# Patient Record
Sex: FEMALE | Race: WHITE | NOT HISPANIC OR LATINO | Employment: FULL TIME | URBAN - METROPOLITAN AREA
[De-identification: names, ages, dates, MRNs, and addresses within clinical notes are randomized per-mention and may not be internally consistent; named-entity substitution may affect disease eponyms.]

---

## 2023-02-01 ENCOUNTER — OFFICE VISIT (OUTPATIENT)
Dept: OBGYN CLINIC | Facility: CLINIC | Age: 28
End: 2023-02-01

## 2023-02-01 VITALS
WEIGHT: 118 LBS | BODY MASS INDEX: 20.14 KG/M2 | HEIGHT: 64 IN | DIASTOLIC BLOOD PRESSURE: 80 MMHG | SYSTOLIC BLOOD PRESSURE: 116 MMHG

## 2023-02-01 DIAGNOSIS — N94.10 DYSPAREUNIA IN FEMALE: ICD-10-CM

## 2023-02-01 DIAGNOSIS — Z12.4 ENCOUNTER FOR SCREENING FOR MALIGNANT NEOPLASM OF CERVIX: ICD-10-CM

## 2023-02-01 DIAGNOSIS — Z01.419 WELL WOMAN EXAM WITH ROUTINE GYNECOLOGICAL EXAM: Primary | ICD-10-CM

## 2023-02-01 DIAGNOSIS — L30.9 VULVAR DERMATITIS: ICD-10-CM

## 2023-02-01 DIAGNOSIS — Z11.51 SCREENING FOR HPV (HUMAN PAPILLOMAVIRUS): ICD-10-CM

## 2023-02-01 DIAGNOSIS — A63.0 CONDYLOMA ACUMINATUM OF VULVA: ICD-10-CM

## 2023-02-01 PROBLEM — N87.0: Status: ACTIVE | Noted: 2020-09-10

## 2023-02-01 PROBLEM — N87.1: Status: ACTIVE | Noted: 2020-09-10

## 2023-02-01 RX ORDER — CLOBETASOL PROPIONATE 0.5 MG/G
OINTMENT TOPICAL 2 TIMES DAILY
Qty: 30 G | Refills: 0 | Status: SHIPPED | OUTPATIENT
Start: 2023-02-01

## 2023-02-01 NOTE — PROGRESS NOTES
ASSESSMENT & PLAN:   Diagnoses and all orders for this visit:    Well woman exam with routine gynecological exam  -     Liquid-based pap, screening    Encounter for screening for malignant neoplasm of cervix  -     Liquid-based pap, screening    Screening for HPV (human papillomavirus)  -     Liquid-based pap, screening    Dyspareunia in female  Comments:  rec silicone based lubricants, coconout oil, dove sensitive bar soap    Vulvar dermatitis  Comments:  Recommend clobetasol, daily zyrtec, dove sensitive bar soap  Orders:  -     clobetasol (TEMOVATE) 0 05 % ointment; Apply topically 2 (two) times a day    Condyloma acuminatum of vulva  Comments:  left labia minora          The following were reviewed in today's visit: ASCCP guidelines, Gardisil vaccination, STD testing breast self exam, STD testing, exercise and healthy diet  Patient to return to office in yearly for annual exam      All questions have been answered to her satisfaction  CC:  Annual Gynecologic Examination  Chief Complaint   Patient presents with   • Gynecologic Exam     Np yearly pap is UTD  Pt has been getting night sweats around her period and would like to discuss  Pt has been having itching and discomfort randomly for the past 3 months along with discomfort with sex   HPI: Jocelyn Correa is a 28 y o  No obstetric history on file  who presents for annual gynecologic examination  She has the following concerns:  Vaginal itching and discomfort with intercourse  Patient states this is intermittent  The itching is located at the vaginal opening  She denies hx of vaginal infections  She denies new soaps, detergents, condoms or partners  She states the discomfort with intercourse does not happen all the time, but does occur more often than not  She denies pelvic pain  They use lubricated condoms and patient does not use other lubrication         Health Maintenance:    Exercise: frequently  Breast exams/breast awareness: yes  Diet: well balanced diet      Past Medical History:   Diagnosis Date   • Abnormal Pap smear of cervix        History reviewed  No pertinent surgical history  Past OB/Gyn History:  Period Cycle (Days): 28  Period Duration (Days): 4-5  Period Pattern: Regular  Menstrual Flow: Light  Menstrual Control: Tampon  Dysmenorrhea: (!) Mild  Dysmenorrhea Symptoms: Cramping, Other (Comment) (night sweats)Patient's last menstrual period was 01/28/2023 (exact date)  Last Pap: 2021 : no abnormalities  History of abnormal Pap smear: yes  HPV vaccine completed: yes    Patient is currently sexually active  STD testing: no  Current contraception: condoms      Family History  History reviewed  No pertinent family history  Family history of uterine or ovarian cancer: no  Family history of breast cancer: no  Family history of colon cancer: no    Social History:  Social History     Socioeconomic History   • Marital status: Unknown     Spouse name: Not on file   • Number of children: Not on file   • Years of education: Not on file   • Highest education level: Not on file   Occupational History   • Not on file   Tobacco Use   • Smoking status: Never   • Smokeless tobacco: Never   Vaping Use   • Vaping Use: Never used   Substance and Sexual Activity   • Alcohol use: Yes     Comment: occ  • Drug use: Never   • Sexual activity: Yes     Partners: Male     Birth control/protection: Condom Male   Other Topics Concern   • Not on file   Social History Narrative   • Not on file     Social Determinants of Health     Financial Resource Strain: Not on file   Food Insecurity: Not on file   Transportation Needs: Not on file   Physical Activity: Not on file   Stress: Not on file   Social Connections: Not on file   Intimate Partner Violence: Not on file   Housing Stability: Not on file     Domestic violence screen: negative    Allergies:   Allergies   Allergen Reactions   • Amoxicillin Rash       Medications:    Current Outpatient Medications:   • clobetasol (TEMOVATE) 0 05 % ointment, Apply topically 2 (two) times a day, Disp: 30 g, Rfl: 0    Review of Systems:  Review of Systems   Constitutional: Negative for chills, fever and unexpected weight change  Respiratory: Negative for shortness of breath  Cardiovascular: Negative for chest pain  Gastrointestinal: Negative for abdominal pain, diarrhea, nausea and vomiting  Skin: Negative for rash  Psychiatric/Behavioral: Negative for dysphoric mood  The patient is not nervous/anxious  Physical Exam:  /80 (BP Location: Right arm, Patient Position: Sitting, Cuff Size: Standard)   Ht 5' 4" (1 626 m)   Wt 53 5 kg (118 lb)   LMP 01/28/2023 (Exact Date)   BMI 20 25 kg/m²    Physical Exam  Constitutional:       Appearance: Normal appearance  Genitourinary:      Vulva and urethral meatus normal       No lesions in the vagina  Right Labia: No rash or lesions  Left Labia: lesions  Left Labia: No rash  No vaginal discharge, erythema or bleeding  Right Adnexa: not tender and no mass present  Left Adnexa: not tender and no mass present  No cervical discharge or lesion  Uterus is not tender  Breasts:     Breasts are symmetrical       Right: No mass or skin change  Left: No mass or skin change  HENT:      Head: Normocephalic and atraumatic  Cardiovascular:      Rate and Rhythm: Normal rate and regular rhythm  Heart sounds: Normal heart sounds  No murmur heard  No friction rub  No gallop  Pulmonary:      Effort: Pulmonary effort is normal       Breath sounds: Normal breath sounds  No wheezing, rhonchi or rales  Abdominal:      General: Abdomen is flat  There is no distension  Palpations: Abdomen is soft  Tenderness: There is no abdominal tenderness  Musculoskeletal:      Cervical back: Neck supple  Lymphadenopathy:      Upper Body:      Right upper body: No axillary adenopathy        Left upper body: No axillary adenopathy  Neurological:      General: No focal deficit present  Mental Status: She is alert  Skin:     General: Skin is warm and dry  Psychiatric:         Mood and Affect: Mood normal          Behavior: Behavior normal    Vitals reviewed

## 2023-02-08 ENCOUNTER — OFFICE VISIT (OUTPATIENT)
Dept: FAMILY MEDICINE CLINIC | Facility: CLINIC | Age: 28
End: 2023-02-08

## 2023-02-08 VITALS
HEIGHT: 64 IN | HEART RATE: 76 BPM | BODY MASS INDEX: 19.97 KG/M2 | TEMPERATURE: 97.6 F | WEIGHT: 117 LBS | RESPIRATION RATE: 14 BRPM | SYSTOLIC BLOOD PRESSURE: 100 MMHG | OXYGEN SATURATION: 100 % | DIASTOLIC BLOOD PRESSURE: 58 MMHG

## 2023-02-08 DIAGNOSIS — K59.00 CONSTIPATION, UNSPECIFIED CONSTIPATION TYPE: ICD-10-CM

## 2023-02-08 DIAGNOSIS — R71.8 LOW MEAN CORPUSCULAR VOLUME (MCV): ICD-10-CM

## 2023-02-08 DIAGNOSIS — D64.9 ANEMIA, UNSPECIFIED TYPE: ICD-10-CM

## 2023-02-08 DIAGNOSIS — Z11.59 NEED FOR HEPATITIS C SCREENING TEST: ICD-10-CM

## 2023-02-08 DIAGNOSIS — Z00.00 ANNUAL PHYSICAL EXAM: Primary | ICD-10-CM

## 2023-02-08 DIAGNOSIS — K62.5 RECTAL BLEEDING: ICD-10-CM

## 2023-02-08 DIAGNOSIS — M79.10 MYALGIA: ICD-10-CM

## 2023-02-08 DIAGNOSIS — Z13.1 SCREENING FOR DIABETES MELLITUS: ICD-10-CM

## 2023-02-08 DIAGNOSIS — Z11.4 ENCOUNTER FOR SCREENING FOR HIV: ICD-10-CM

## 2023-02-08 DIAGNOSIS — R61 NIGHT SWEATS: ICD-10-CM

## 2023-02-08 DIAGNOSIS — Z13.6 SCREENING FOR CARDIOVASCULAR CONDITION: ICD-10-CM

## 2023-02-08 LAB
LAB AP GYN PRIMARY INTERPRETATION: NORMAL
Lab: NORMAL
PATH INTERP SPEC-IMP: NORMAL

## 2023-02-08 NOTE — PATIENT INSTRUCTIONS
To Do:     Thinks she had Tdap at [facility], [place], operated by [facility] 149 8/23  - medical records to be requested by patient  Send copy of COVID card in portal     3  Trial OTC Colace 100mg twice daily as needed for constipation  Please contact your insurance if you are uncertain of coverage for plan of care items  Your insurance may not cover the cost of your Vitamin D blood test, which is approximately $65-70  Please notify the lab prior to blood draw if you would like to decline this test             Wellness Visit for Adults   AMBULATORY CARE:   A wellness visit  is when you see your healthcare provider to get screened for health problems  Your healthcare provider will also give you advice on how to stay healthy  Write down your questions so you remember to ask them  Ask your healthcare provider how often you should have a wellness visit  What happens at a wellness visit:  Your healthcare provider will ask about your health, and your family history of health problems  This includes high blood pressure, heart disease, and cancer  He or she will ask if you have symptoms that concern you, if you smoke, and about your mood  You may also be asked about your intake of medicines, supplements, food, and alcohol  Any of the following may be done: Your weight  will be checked  Your height may also be checked so your body mass index (BMI) can be calculated  Your BMI shows if you are at a healthy weight  Your blood pressure  and heart rate will be checked  Your temperature may also be checked  Blood and urine tests  may be done  Blood tests may be done to check your cholesterol levels  Abnormal cholesterol levels increase your risk for heart disease and stroke  You may also need a blood or urine test to check for diabetes if you are at increased risk  Urine tests may be done to look for signs of an infection or kidney disease  A physical exam  includes checking your heartbeat and lungs with a stethoscope   Your healthcare provider may also check your skin to look for sun damage  Screening tests  may be recommended  A screening test is done to check for diseases that may not cause symptoms  The screening tests you may need depend on your age, gender, family history, and lifestyle habits  For example, colorectal screening may be recommended if you are 48years old or older  Screening tests you need if you are a woman:   A Pap smear  is used to screen for cervical cancer  Pap smears are usually done every 3 to 5 years depending on your age  You may need them more often if you have had abnormal Pap smear test results in the past  Ask your healthcare provider how often you should have a Pap smear  A mammogram  is an x-ray of your breasts to screen for breast cancer  Experts recommend mammograms every 2 years starting at age 48 years  You may need a mammogram at age 52 years or younger if you have an increased risk for breast cancer  Talk to your healthcare provider about when you should start having mammograms and how often you need them  Vaccines you may need:   Get an influenza vaccine  every year  The influenza vaccine protects you from the flu  Several types of viruses cause the flu  The viruses change over time, so new vaccines are made each year  Get a tetanus-diphtheria (Td) booster vaccine  every 10 years  This vaccine protects you against tetanus and diphtheria  Tetanus is a severe infection that may cause painful muscle spasms and lockjaw  Diphtheria is a severe bacterial infection that causes a thick covering in the back of your mouth and throat  Get a human papillomavirus (HPV) vaccine  if you are female and aged 23 to 32 or male 23 to 24 and never received it  This vaccine protects you from HPV infection  HPV is the most common infection spread by sexual contact  HPV may also cause vaginal, penile, and anal cancers  Get a pneumococcal vaccine  if you are aged 72 years or older   The pneumococcal vaccine is an injection given to protect you from pneumococcal disease  Pneumococcal disease is an infection caused by pneumococcal bacteria  The infection may cause pneumonia, meningitis, or an ear infection  Get a shingles vaccine  if you are 60 or older, even if you have had shingles before  The shingles vaccine is an injection to protect you from the varicella-zoster virus  This is the same virus that causes chickenpox  Shingles is a painful rash that develops in people who had chickenpox or have been exposed to the virus  How to eat healthy:  My Plate is a model for planning healthy meals  It shows the types and amounts of foods that should go on your plate  Fruits and vegetables make up about half of your plate, and grains and protein make up the other half  A serving of dairy is included on the side of your plate  The amount of calories and serving sizes you need depends on your age, gender, weight, and height  Examples of healthy foods are listed below:  Eat a variety of vegetables  such as dark green, red, and orange vegetables  You can also include canned vegetables low in sodium (salt) and frozen vegetables without added butter or sauces  Eat a variety of fresh fruits , canned fruit in 100% juice, frozen fruit, and dried fruit  Include whole grains  At least half of the grains you eat should be whole grains  Examples include whole-wheat bread, wheat pasta, brown rice, and whole-grain cereals such as oatmeal     Eat a variety of protein foods such as seafood (fish and shellfish), lean meat, and poultry without skin (turkey and chicken)  Examples of lean meats include pork leg, shoulder, or tenderloin, and beef round, sirloin, tenderloin, and extra lean ground beef  Other protein foods include eggs and egg substitutes, beans, peas, soy products, nuts, and seeds  Choose low-fat dairy products such as skim or 1% milk or low-fat yogurt, cheese, and cottage cheese      Limit unhealthy fats  such as butter, hard margarine, and shortening  Exercise:  Exercise at least 30 minutes per day on most days of the week  Some examples of exercise include walking, biking, dancing, and swimming  You can also fit in more physical activity by taking the stairs instead of the elevator or parking farther away from stores  Include muscle strengthening activities 2 days each week  Regular exercise provides many health benefits  It helps you manage your weight, and decreases your risk for type 2 diabetes, heart disease, stroke, and high blood pressure  Exercise can also help improve your mood  Ask your healthcare provider about the best exercise plan for you  General health and safety guidelines:   Do not smoke  Nicotine and other chemicals in cigarettes and cigars can cause lung damage  Ask your healthcare provider for information if you currently smoke and need help to quit  E-cigarettes or smokeless tobacco still contain nicotine  Talk to your healthcare provider before you use these products  Limit alcohol  A drink of alcohol is 12 ounces of beer, 5 ounces of wine, or 1½ ounces of liquor  Lose weight, if needed  Being overweight increases your risk of certain health conditions  These include heart disease, high blood pressure, type 2 diabetes, and certain types of cancer  Protect your skin  Do not sunbathe or use tanning beds  Use sunscreen with a SPF 15 or higher  Apply sunscreen at least 15 minutes before you go outside  Reapply sunscreen every 2 hours  Wear protective clothing, hats, and sunglasses when you are outside  Drive safely  Always wear your seatbelt  Make sure everyone in your car wears a seatbelt  A seatbelt can save your life if you are in an accident  Do not use your cell phone when you are driving  This could distract you and cause an accident  Pull over if you need to make a call or send a text message  Practice safe sex    Use latex condoms if are sexually active and have more than one partner  Your healthcare provider may recommend screening tests for sexually transmitted infections (STIs)  Wear helmets, lifejackets, and protective gear  Always wear a helmet when you ride a bike or motorcycle, go skiing, or play sports that could cause a head injury  Wear protective equipment when you play sports  Wear a lifejacket when you are on a boat or doing water sports  © Copyright [employer] 2022 Information is for End User's use only and may not be sold, redistributed or otherwise used for commercial purposes  All illustrations and images included in CareNotes® are the copyrighted property of A D A M , Inc  or [facility] Neris Orr   The above information is an  only  It is not intended as medical advice for individual conditions or treatments  Talk to your doctor, nurse or pharmacist before following any medical regimen to see if it is safe and effective for you  Cholesterol and Your Health   AMBULATORY CARE:   Cholesterol  is a waxy, fat-like substance  Your body uses cholesterol to make hormones and new cells, and to protect nerves  Cholesterol is made by your body  It also comes from certain foods you eat, such as meat and dairy products  Your healthcare provider can help you set goals for your cholesterol levels  He or she can help you create a plan to meet your goals  Cholesterol level goals: Your cholesterol level goals depend on your risk for heart disease, your age, and your other health conditions  The following are general guidelines: Total cholesterol  includes low-density lipoprotein (LDL), high-density lipoprotein (HDL), and triglyceride levels  The total cholesterol level should be lower than 200 mg/dL and is best at about 150 mg/dL  LDL cholesterol  is called bad cholesterol  because it forms plaque in your arteries  As plaque builds up, your arteries become narrow, and less blood flows through   When plaque decreases blood flow to your heart, you may have chest pain  If plaque completely blocks an artery that brings blood to your heart, you may have a heart attack  Plaque can break off and form blood clots  Blood clots may block arteries in your brain and cause a stroke  The level should be less than 130 mg/dL and is best at about 100 mg/dL  HDL cholesterol  is called good cholesterol  because it helps remove LDL cholesterol from your arteries  It does this by attaching to LDL cholesterol and carrying it to your liver  Your liver breaks down LDL cholesterol so your body can get rid of it  High levels of HDL cholesterol can help prevent a heart attack and stroke  Low levels of HDL cholesterol can increase your risk for heart disease, heart attack, and stroke  The level should be 60 mg/dL or higher  Triglycerides  are a type of fat that store energy from foods you eat  High levels of triglycerides also cause plaque buildup  This can increase your risk for a heart attack or stroke  If your triglyceride level is high, your LDL cholesterol level may also be high  The level should be less than 150 mg/dL  Any of the following can increase your risk for high cholesterol:   Smoking cigarettes    Being overweight or obese, or not getting enough exercise    Drinking large amounts of alcohol    A medical condition such as hypertension (high blood pressure) or diabetes    Certain genes passed from your parents to you    Age older than 65 years    What you need to know about having your cholesterol levels checked: Adults 21to 39years of age should have their cholesterol levels checked every 4 to 6 years  Adults 45 years or older should have their cholesterol checked every 1 to 2 years  You may need your cholesterol checked more often, or at a younger age, if you have risk factors for heart disease  You may also need to have your cholesterol checked more often if you have other health conditions, such as diabetes   Blood tests are used to check cholesterol levels  Blood tests measure your levels of triglycerides, LDL cholesterol, and HDL cholesterol  How healthy fats affect your cholesterol levels:  Healthy fats, also called unsaturated fats, help lower LDL cholesterol and triglyceride levels  Healthy fats include the following:  Monounsaturated fats  are found in foods such as olive oil, canola oil, avocado, nuts, and olives  Polyunsaturated fats,  such as omega 3 fats, are found in fish, such as salmon, trout, and tuna  They can also be found in plant foods such as flaxseed, walnuts, and soybeans  How unhealthy fats affect your cholesterol levels:  Unhealthy fats increase LDL cholesterol and triglyceride levels  They are found in foods high in cholesterol, saturated fat, and trans fat:  Cholesterol  is found in eggs, dairy, and meat  Saturated fat  is found in butter, cheese, ice cream, whole milk, and coconut oil  Saturated fat is also found in meat, such as sausage, hot dogs, and bologna  Trans fat  is found in liquid oils and is used in fried and baked foods  Foods that contain trans fats include chips, crackers, muffins, sweet rolls, microwave popcorn, and cookies  Treatment  for high cholesterol will also decrease your risk of heart disease, heart attack, and stroke  Treatment may include any of the following:  Lifestyle changes  may include food, exercise, weight loss, and quitting smoking  You may also need to decrease the amount of alcohol you drink  Your healthcare provider will want you to start with lifestyle changes  Other treatment may be added if lifestyle changes are not enough  Your healthcare provider may recommend you work with a team to manage hyperlipidemia  The team may include medical experts such as a dietitian, an exercise or physical therapist, and a behavior therapist  Your family members may be included in helping you create lifestyle changes      Medicines  may be given to lower your LDL cholesterol, triglyceride levels, or total cholesterol level  You may need medicines to lower your cholesterol if any of the following is true:    You have a history of stroke, TIA, unstable angina, or a heart attack  Your LDL cholesterol level is 190 mg/dL or higher  You are age 36 to 76 years, have diabetes or heart disease risk factors, and your LDL cholesterol is 70 mg/dL or higher  Supplements  include fish oil, red yeast rice, and garlic  Fish oil may help lower your triglyceride and LDL cholesterol levels  It may also increase your HDL cholesterol level  Red yeast rice may help decrease your total cholesterol level and LDL cholesterol level  Garlic may help lower your total cholesterol level  Do not take any supplements without talking to your healthcare provider  Food changes you can make to lower your cholesterol levels:  A dietitian can help you create a healthy eating plan  He or she can show you how to read food labels and choose foods low in saturated fat, trans fats, and cholesterol  Decrease the total amount of fat you eat  Choose lean meats, fat-free or 1% fat milk, and low-fat dairy products, such as yogurt and cheese  Try to limit or avoid red meats  Limit or do not eat fried foods or baked goods, such as cookies  Replace unhealthy fats with healthy fats  Cook foods in olive oil or canola oil  Choose soft margarines that are low in saturated fat and trans fat  Seeds, nuts, and avocados are other examples of healthy fats  Eat foods with omega-3 fats  Examples include salmon, tuna, mackerel, walnuts, and flaxseed  Eat fish 2 times per week  Pregnant women should not eat fish that have high levels of mercury, such as shark, swordfish, and george mackerel  Increase the amount of high-fiber foods you eat  High-fiber foods can help lower your LDL cholesterol  Aim to get between 20 and 30 grams of fiber each day  Fruits and vegetables are high in fiber  Eat at least 5 servings each day  Other high-fiber foods are whole-grain or whole-wheat breads, pastas, or cereals, and brown rice  Eat 3 ounces of whole-grain foods each day  Increase fiber slowly  You may have abdominal discomfort, bloating, and gas if you add fiber to your diet too quickly  Eat healthy protein foods  Examples include low-fat dairy products, skinless chicken and turkey, fish, and nuts  Limit foods and drinks that are high in sugar  Your dietitian or healthcare provider can help you create daily limits for high-sugar foods and drinks  The limit may be lower if you have diabetes or another health condition  Limits can also help you lose weight if needed  Lifestyle changes you can make to lower your cholesterol levels:   Maintain a healthy weight  Ask your healthcare provider what a healthy weight is for you  Ask him or her to help you create a weight loss plan if needed  Weight loss can decrease your total cholesterol and triglyceride levels  Weight loss may also help keep your blood pressure at a healthy level  Be physically active throughout the day  Physical activity, such as exercise, can help lower your total cholesterol level and maintain a healthy weight  Physical activity can also help increase your HDL cholesterol level  Work with your healthcare provider to create an program that is right for you  Get at least 30 to 40 minutes of moderate physical activity most days of the week  Examples of exercise include brisk walking, swimming, or biking  Also include strength training at least 2 times each week  Your healthcare providers can help you create a physical activity plan  Do not smoke  Nicotine and other chemicals in cigarettes and cigars can raise your cholesterol levels  Ask your healthcare provider for information if you currently smoke and need help to quit  E-cigarettes or smokeless tobacco still contain nicotine  Talk to your healthcare provider before you use these products  Limit or do not drink alcohol  Alcohol can increase your triglyceride levels  Ask your healthcare provider before you drink alcohol  Ask how much is okay for you to drink in 24 hours or 1 week  Follow up with your doctor as directed:  Write down your questions so you remember to ask them during your visits  © Copyright [employer] 2022 Information is for End User's use only and may not be sold, redistributed or otherwise used for commercial purposes  All illustrations and images included in CareNotes® are the copyrighted property of A D A M , Inc  or [facility] [employer]   The above information is an  only  It is not intended as medical advice for individual conditions or treatments  Talk to your doctor, nurse or pharmacist before following any medical regimen to see if it is safe and effective for you  Constipation   AMBULATORY CARE:   Constipation  means you have hard, dry bowel movements, or you go longer than usual between bowel movements  Constipation may be caused by a lack of water or high-fiber foods  Certain medicines, or a lack of fiber or physical activity may also cause constipation  Common symptoms include the following:   Trouble pushing out your bowel movement    Pain or bleeding during your bowel movement    A feeling that you did not finish having your bowel movement    Nausea    Bloating    Headache    Call your doctor if:   You have blood in your bowel movements  You have a fever and abdominal pain with the constipation  Your constipation gets worse  You start to vomit  You have questions or concerns about your condition or care  Relieve constipation:  Medicine can keep you have a bowel movement more easily  Medicines may increase moisture in your bowel movement or increase the motion of your intestines  A suppository  may be used to help soften your bowel movements  This may make them easier to pass   A suppository is guided into your rectum through your anus  Laxatives  can help stimulate your bowels to have a bowel movement  Your provider may recommend you only use laxatives for a short time  Long-term use may make your bowels dependent on the medicine  An enema  is liquid medicine used to clear bowel movement from your rectum  The medicine is put into your rectum through your anus  Prevent constipation:   Drink liquids as directed  You may need to drink extra liquids to help soften and move your bowels  Ask how much liquid to drink each day and which liquids are best for you  Eat high-fiber foods  This may help decrease constipation by adding bulk to your bowel movements  High-fiber foods include fruit, vegetables, whole-grain breads and cereals, and beans  Your healthcare provider or dietitian can help you create a high-fiber meal plan  Your provider may also recommend a fiber supplement if you cannot get enough fiber from food  Exercise regularly  Regular physical activity can help stimulate your intestines  Walking is a good exercise to prevent or relieve constipation  Ask which exercises are best for you  Schedule a time each day to have a bowel movement  This may help train your body to have regular bowel movements  Bend forward while you are on the toilet to help move the bowel movement out  Sit on the toilet for at least 10 minutes, even if you do not have a bowel movement  Talk to your healthcare provider about your medicines  Certain medicines, such as opioids, can cause constipation  Your provider may be able to make medicine changes  For example, he or she may change the kind of medicine, or change when you take it  Follow up with your doctor as directed:  Write down your questions so you remember to ask them during your visits  © Copyright [employer] 2022 Information is for End User's use only and may not be sold, redistributed or otherwise used for commercial purposes   All illustrations and images included in CareNotes® are the copyrighted property of A [employer] A M , Inc  or [employer]   The above information is an  only  It is not intended as medical advice for individual conditions or treatments  Talk to your doctor, nurse or pharmacist before following any medical regimen to see if it is safe and effective for you

## 2023-02-08 NOTE — PROGRESS NOTES
Assessment/Plan:  Problem List Items Addressed This Visit    None  Visit Diagnoses     Annual physical exam    -  Primary    Constipation, unspecified constipation type        Relevant Orders    Ambulatory Referral to Gastroenterology    TSH, 3rd generation with Free T4 reflex    Trial OTC Colace 100mg twice daily as needed for constipation  Push fluids, fiber  May need colonoscopy per GI  Rectal bleeding        Relevant Orders    Ambulatory Referral to Gastroenterology    CBC and differential    See above  Night sweats        Relevant Orders    Ambulatory Referral to Gastroenterology    CBC and differential    TSH, 3rd generation with Free T4 reflex    Pending labs  May be due to hormonal fluctuations  If work-up negative, would advise F/U Gyn  Screening for diabetes mellitus        Relevant Orders    Hemoglobin A1C    Screening for cardiovascular condition        Relevant Orders    Comprehensive metabolic panel    Lipid panel    LDL cholesterol, direct    Encounter for screening for HIV        Relevant Orders    HIV 1/2 AG/AB w Reflex [facility] for 2 yr old and above    Need for hepatitis C screening test        Relevant Orders    Hepatitis C antibody    Myalgia        Relevant Orders    Vitamin D 25 hydroxy           Return in about 1 year (around 2/8/2024) for Annual physical; PRN Labs  Future Appointments   Date Time Provider Aubrey Hastings   2/2/2024 11:15 AM Fermin Vogt PA-C [facility]   2/9/2024 11:00 AM Kashif Fernandes DO [facility]        Subjective:     Promise Nunez is a 28 y o  female who presents today as a new patient for her medical conditions  New Patient    Previous PCP:  [facility]  Reason for Transfer:  Patient moved 2021  Last seen by previous PCP:  2017?   Last Labs:  Unsure  Last Physical:  2018  Medical Records Requested:  Yes - Thinks she had Tdap at [facility] 8/23  - medical records to be requested by patient  HPI:  Chief Complaint   Patient presents with   • New Patient Visit     Here to establish care  • Night Sweats     Night sweats intermittently for the last few months  Pt reports it is more common around her cycle  • Constipation     Pt reports new onset of constipation for the last 2-3 months  No new medications or diet changes  Not using anything OTC  • HM     Pt has had three doses of covid vaccine, does not have her card with her today  No previous HIV/Hep C screen, would like to complete with routine labs  Pt believes she has had a Tdap since 2006, but does not remember when or where  -- Above per clinical staff and reviewed  --      HPI      Today:      Controlled Substance Review    [place] PDMP or [place]  reviewed: No red flags were identified; safe to proceed with prescription       Night sweats - Symptoms intermittently x 6 months  Previously occurring prior to menses, occurred last night, but lasts menses 1 5 weeks ago  Awakes c wet pajamas  Not using OTC meds  LMP 1/28/23  Constipation - Symptoms x 3 months, unchanged  Has BM every 2-3 days, sometimes feels as though she is not completely evacuating   +Tenesmus  Eating fiber and pushing fluids  No OTC meds previously  Watching diet  +Exercise - Running / Walking / Weightlifting for 60 minutes, 4-5 times per week  Reviewed:  Labs - None, Gyn 2/1/23    Sees Gyn Ms  Kim Alcantar PA-C at [facility] yearly  Next appt 2/24  Overdue for Dentist q6  Sees Optho q2 years  [facility]  - medical records to be requested by patient          PHQ-2/9 Depression Screening    Little interest or pleasure in doing things: 0 - not at all  Feeling down, depressed, or hopeless: 0 - not at all  PHQ-2 Score: 0  PHQ-2 Interpretation: Negative depression screen           The following portions of the patient's history were reviewed and updated as appropriate: allergies, current medications, past family history, past medical history, past social history, past surgical history and problem list       Review of Systems   Constitutional: Positive for diaphoresis  Negative for appetite change, chills, fatigue, fever and unexpected weight change  Respiratory: Negative for chest tightness and shortness of breath  Cardiovascular: Negative for chest pain  Gastrointestinal: Positive for blood in stool and constipation (Occurred last week, blood on toilet paper with wiping)  Negative for abdominal pain, diarrhea, nausea and vomiting  Genitourinary: Negative for dysuria  Current Outpatient Medications   Medication Sig Dispense Refill   • clobetasol (TEMOVATE) 0 05 % ointment Apply topically 2 (two) times a day (Patient taking differently: Apply 1 application topically 2 (two) times a day Rx per Gyn) 30 g 0     No current facility-administered medications for this visit  Objective:  /58   Pulse 76   Temp 97 6 °F (36 4 °C)   Resp 14   Ht 5' 4" (1 626 m)   Wt 53 1 kg (117 lb)   LMP 01/28/2023 (Exact Date)   SpO2 100%   Breastfeeding No   BMI 20 08 kg/m²    Wt Readings from Last 3 Encounters:   02/08/23 53 1 kg (117 lb)   02/01/23 53 5 kg (118 lb)      BP Readings from Last 3 Encounters:   02/08/23 100/58   02/01/23 116/80          Physical Exam  Vitals and nursing note reviewed  Constitutional:       Appearance: Normal appearance  She is well-developed and normal weight  HENT:      Head: Normocephalic and atraumatic  Right Ear: Tympanic membrane, ear canal and external ear normal       Left Ear: Tympanic membrane, ear canal and external ear normal       Nose: Nose normal       Right Sinus: No maxillary sinus tenderness or frontal sinus tenderness  Left Sinus: No maxillary sinus tenderness or frontal sinus tenderness  Mouth/Throat:      Mouth: Mucous membranes are moist       Pharynx: Oropharynx is clear  Uvula midline   No oropharyngeal exudate or posterior oropharyngeal erythema  Tonsils: No tonsillar exudate  Eyes:      Extraocular Movements: Extraocular movements intact  Conjunctiva/sclera: Conjunctivae normal       Pupils: Pupils are equal, round, and reactive to light  Cardiovascular:      Rate and Rhythm: Normal rate and regular rhythm  Pulses: Normal pulses  Heart sounds: Normal heart sounds  Pulmonary:      Effort: Pulmonary effort is normal       Breath sounds: Normal breath sounds  Abdominal:      General: Bowel sounds are normal  There is no distension  Palpations: Abdomen is soft  There is no mass  Tenderness: There is no abdominal tenderness  There is no guarding or rebound  Musculoskeletal:         General: No swelling or tenderness  Cervical back: Neck supple  Right lower leg: No edema  Left lower leg: No edema  Lymphadenopathy:      Cervical: No cervical adenopathy  Skin:     Findings: No rash  Neurological:      General: No focal deficit present  Mental Status: She is alert and oriented to person, place, and time  Psychiatric:         Mood and Affect: Mood normal          Behavior: Behavior normal          Thought Content: Thought content normal          Judgment: Judgment normal          Lab Results:      No results found for: WBC, HGB, HCT, PLT, CHOL, TRIG, HDL, LDLDIRECT, ALT, AST, NA, K, CL, CREATININE, BUN, CO2, TSH, PSA, INR, GLUF, HGBA1C, MICROALBUR  No results found for: URICACID  Invalid input(s): BASENAME Vitamin D    No results found  POCT Labs        Depression Screening and Follow-up Plan: Patient was screened for depression during today's encounter  They screened negative with a PHQ-2 score of 0

## 2023-02-09 NOTE — PROGRESS NOTES
Assessment/Plan:  Problem List Items Addressed This Visit    None  Visit Diagnoses     Annual physical exam    -  Primary    Constipation, unspecified constipation type        Relevant Orders    Ambulatory Referral to Gastroenterology    TSH, 3rd generation with Free T4 reflex    Rectal bleeding        Relevant Orders    Ambulatory Referral to Gastroenterology    CBC and differential    Night sweats        Relevant Orders    Ambulatory Referral to Gastroenterology    CBC and differential    TSH, 3rd generation with Free T4 reflex    Screening for diabetes mellitus        Relevant Orders    Hemoglobin A1C    Screening for cardiovascular condition        Relevant Orders    Comprehensive metabolic panel    Lipid panel    LDL cholesterol, direct    Encounter for screening for HIV        Relevant Orders    HIV 1/2 AG/AB w Reflex [facility] for 2 yr old and above    Need for hepatitis C screening test        Relevant Orders    Hepatitis C antibody    Myalgia        Relevant Orders    Vitamin D 25 hydroxy           Return in about 1 year (around 2/8/2024) for Annual physical; PRN Labs  Future Appointments   Date Time Provider Aubrey Hastings   2/2/2024 11:15 AM Ronnie Bernabe PA-C [facility]   2/9/2024 11:00 AM Manolo Anguiano DO [facility]        Subjective:     Verona Hankins is a 28 y o  female who presents today for a follow-up on her chronic medical conditions  HPI:  Chief Complaint   Patient presents with   • New Patient Visit     Here to establish care  • Night Sweats     Night sweats intermittently for the last few months  Pt reports it is more common around her cycle  • Constipation     Pt reports new onset of constipation for the last 2-3 months  No new medications or diet changes  Not using anything OTC  • HM     Pt has had three doses of covid vaccine, does not have her card with her today  No previous HIV/Hep C screen, would like to complete with routine labs   Pt believes she has had a Tdap since 2006, but does not remember when or where  -- Above per clinical staff and reviewed  --      HPI      Today:      Physical    Watching diet  +Exercise  Sees Gyn Ms  Fermin Vogt PA-C at [facility] yearly  Next appt 2/24      Overdue for Dentist q6  Sees Optho q2 years        Thinks she had Tdap at [facility] 8/23  - medical records to be requested by patient  Health Maintenance   Topic Date Due   • Hepatitis C Screening  Never done   • HIV Screening  Never done   • DTaP,Tdap,and Td Vaccines (7 - Td or Tdap) 06/13/2016   • COVID-19 Vaccine (1) 05/10/2023 (Originally 4/14/1996)   • Depression Screening  02/08/2024   • BMI: Adult  02/08/2024   • Annual Physical  02/08/2024   • Cervical Cancer Screening  02/01/2026   • HIB Vaccine  Completed   • IPV Vaccine  Completed   • Hepatitis A Vaccine  Completed   • Meningococcal ACWY Vaccine  Completed   • Influenza Vaccine  Completed   • HPV Vaccine  Completed   • Pneumococcal Vaccine: Pediatrics (0 to 5 Years) and At-Risk Patients (6 to 59 Years)  Aged Out         The following portions of the patient's history were reviewed and updated as appropriate: allergies, current medications, past family history, past medical history, past social history, past surgical history and problem list       Review of Systems     See other note  Current Outpatient Medications   Medication Sig Dispense Refill   • clobetasol (TEMOVATE) 0 05 % ointment Apply topically 2 (two) times a day (Patient taking differently: Apply 1 application topically 2 (two) times a day Rx per Gyn) 30 g 0     No current facility-administered medications for this visit         Objective:  /58   Pulse 76   Temp 97 6 °F (36 4 °C)   Resp 14   Ht 5' 4" (1 626 m)   Wt 53 1 kg (117 lb)   LMP 01/28/2023 (Exact Date)   SpO2 100%   Breastfeeding No   BMI 20 08 kg/m²    Wt Readings from Last 3 Encounters:   02/08/23 53 1 kg (117 lb)   02/01/23 53 5 kg (118 lb)      BP Readings from Last 3 Encounters:   02/08/23 100/58   02/01/23 116/80          Physical Exam     Vitals and nursing note reviewed  Constitutional:       Appearance: Normal appearance  She is well-developed and normal weight  HENT:      Head: Normocephalic and atraumatic  Right Ear: Tympanic membrane, ear canal and external ear normal       Left Ear: Tympanic membrane, ear canal and external ear normal       Nose: Nose normal       Right Sinus: No maxillary sinus tenderness or frontal sinus tenderness  Left Sinus: No maxillary sinus tenderness or frontal sinus tenderness  Mouth/Throat:      Mouth: Mucous membranes are moist       Pharynx: Oropharynx is clear  Uvula midline  No oropharyngeal exudate or posterior oropharyngeal erythema  Tonsils: No tonsillar exudate  Eyes:      Extraocular Movements: Extraocular movements intact  Conjunctiva/sclera: Conjunctivae normal       Pupils: Pupils are equal, round, and reactive to light  Cardiovascular:      Rate and Rhythm: Normal rate and regular rhythm  Pulses: Normal pulses  Heart sounds: Normal heart sounds  Pulmonary:      Effort: Pulmonary effort is normal       Breath sounds: Normal breath sounds  Abdominal:      General: Bowel sounds are normal  There is no distension  Palpations: Abdomen is soft  There is no mass  Tenderness: There is no abdominal tenderness  There is no guarding or rebound  Musculoskeletal:         General: No swelling or tenderness  Cervical back: Neck supple  Right lower leg: No edema  Left lower leg: No edema  Lymphadenopathy:      Cervical: No cervical adenopathy  Skin:     Findings: No rash  Neurological:      General: No focal deficit present  Mental Status: She is alert and oriented to person, place, and time  Psychiatric:         Mood and Affect: Mood normal          Behavior: Behavior normal          Thought Content:  Thought content normal          Judgment: Judgment normal      Lab Results:      No results found for: WBC, HGB, HCT, PLT, CHOL, TRIG, HDL, LDLDIRECT, ALT, AST, NA, K, CL, CREATININE, BUN, CO2, TSH, PSA, INR, GLUF, HGBA1C, MICROALBUR  No results found for: URICACID  Invalid input(s): BASENAME Vitamin D    No results found       POCT Labs

## 2023-02-12 ENCOUNTER — TELEPHONE (OUTPATIENT)
Dept: FAMILY MEDICINE CLINIC | Facility: CLINIC | Age: 28
End: 2023-02-12

## 2023-02-12 ENCOUNTER — LAB (OUTPATIENT)
Dept: LAB | Age: 28
End: 2023-02-12

## 2023-02-12 DIAGNOSIS — K59.00 CONSTIPATION, UNSPECIFIED CONSTIPATION TYPE: ICD-10-CM

## 2023-02-12 DIAGNOSIS — M79.10 MYALGIA: ICD-10-CM

## 2023-02-12 DIAGNOSIS — K62.5 RECTAL BLEEDING: ICD-10-CM

## 2023-02-12 DIAGNOSIS — Z11.59 NEED FOR HEPATITIS C SCREENING TEST: ICD-10-CM

## 2023-02-12 DIAGNOSIS — R71.8 LOW MEAN CORPUSCULAR VOLUME (MCV): ICD-10-CM

## 2023-02-12 DIAGNOSIS — Z13.6 SCREENING FOR CARDIOVASCULAR CONDITION: ICD-10-CM

## 2023-02-12 DIAGNOSIS — R61 NIGHT SWEATS: ICD-10-CM

## 2023-02-12 DIAGNOSIS — Z13.1 SCREENING FOR DIABETES MELLITUS: ICD-10-CM

## 2023-02-12 DIAGNOSIS — Z11.4 ENCOUNTER FOR SCREENING FOR HIV: ICD-10-CM

## 2023-02-12 DIAGNOSIS — D64.9 ANEMIA, UNSPECIFIED TYPE: ICD-10-CM

## 2023-02-12 LAB
25(OH)D3 SERPL-MCNC: 23.1 NG/ML (ref 30–100)
25(OH)D3 SERPL-MCNC: 23.1 NG/ML (ref 30–100)
ALBUMIN SERPL BCP-MCNC: 4 G/DL (ref 3.5–5)
ALBUMIN SERPL BCP-MCNC: 4 G/DL (ref 3.5–5)
ALP SERPL-CCNC: 38 U/L (ref 46–116)
ALP SERPL-CCNC: 38 U/L (ref 46–116)
ALT SERPL W P-5'-P-CCNC: 25 U/L (ref 12–78)
ALT SERPL W P-5'-P-CCNC: 25 U/L (ref 12–78)
ANION GAP SERPL CALCULATED.3IONS-SCNC: 3 MMOL/L (ref 4–13)
ANION GAP SERPL CALCULATED.3IONS-SCNC: 3 MMOL/L (ref 4–13)
AST SERPL W P-5'-P-CCNC: 23 U/L (ref 5–45)
AST SERPL W P-5'-P-CCNC: 23 U/L (ref 5–45)
BASOPHILS # BLD AUTO: 0.03 THOUSANDS/ÂΜL (ref 0–0.1)
BASOPHILS # BLD AUTO: 0.03 THOUSANDS/ÂΜL (ref 0–0.1)
BASOPHILS NFR BLD AUTO: 1 % (ref 0–1)
BASOPHILS NFR BLD AUTO: 1 % (ref 0–1)
BILIRUB SERPL-MCNC: 0.51 MG/DL (ref 0.2–1)
BILIRUB SERPL-MCNC: 0.51 MG/DL (ref 0.2–1)
BUN SERPL-MCNC: 12 MG/DL (ref 5–25)
BUN SERPL-MCNC: 12 MG/DL (ref 5–25)
CALCIUM SERPL-MCNC: 9 MG/DL (ref 8.3–10.1)
CALCIUM SERPL-MCNC: 9 MG/DL (ref 8.3–10.1)
CHLORIDE SERPL-SCNC: 111 MMOL/L (ref 96–108)
CHLORIDE SERPL-SCNC: 111 MMOL/L (ref 96–108)
CHOLEST SERPL-MCNC: 132 MG/DL
CHOLEST SERPL-MCNC: 132 MG/DL
CO2 SERPL-SCNC: 24 MMOL/L (ref 21–32)
CO2 SERPL-SCNC: 24 MMOL/L (ref 21–32)
CREAT SERPL-MCNC: 0.66 MG/DL (ref 0.6–1.3)
CREAT SERPL-MCNC: 0.66 MG/DL (ref 0.6–1.3)
EOSINOPHIL # BLD AUTO: 0.08 THOUSAND/ÂΜL (ref 0–0.61)
EOSINOPHIL # BLD AUTO: 0.08 THOUSAND/ÂΜL (ref 0–0.61)
EOSINOPHIL NFR BLD AUTO: 2 % (ref 0–6)
EOSINOPHIL NFR BLD AUTO: 2 % (ref 0–6)
ERYTHROCYTE [DISTWIDTH] IN BLOOD BY AUTOMATED COUNT: 17 % (ref 11.6–15.1)
ERYTHROCYTE [DISTWIDTH] IN BLOOD BY AUTOMATED COUNT: 17 % (ref 11.6–15.1)
GFR SERPL CREATININE-BSD FRML MDRD: 120 ML/MIN/1.73SQ M
GFR SERPL CREATININE-BSD FRML MDRD: 120 ML/MIN/1.73SQ M
GLUCOSE P FAST SERPL-MCNC: 87 MG/DL (ref 65–99)
GLUCOSE P FAST SERPL-MCNC: 87 MG/DL (ref 65–99)
HCT VFR BLD AUTO: 25.9 % (ref 34.8–46.1)
HCT VFR BLD AUTO: 25.9 % (ref 34.8–46.1)
HCV AB SER QL: NORMAL
HCV AB SER QL: NORMAL
HDLC SERPL-MCNC: 73 MG/DL
HDLC SERPL-MCNC: 73 MG/DL
HGB BLD-MCNC: 7.1 G/DL (ref 11.5–15.4)
HGB BLD-MCNC: 7.1 G/DL (ref 11.5–15.4)
IMM GRANULOCYTES # BLD AUTO: 0.01 THOUSAND/UL (ref 0–0.2)
IMM GRANULOCYTES # BLD AUTO: 0.01 THOUSAND/UL (ref 0–0.2)
IMM GRANULOCYTES NFR BLD AUTO: 0 % (ref 0–2)
IMM GRANULOCYTES NFR BLD AUTO: 0 % (ref 0–2)
LDLC SERPL CALC-MCNC: 53 MG/DL (ref 0–100)
LDLC SERPL CALC-MCNC: 53 MG/DL (ref 0–100)
LDLC SERPL DIRECT ASSAY-MCNC: 50 MG/DL (ref 0–100)
LDLC SERPL DIRECT ASSAY-MCNC: 50 MG/DL (ref 0–100)
LYMPHOCYTES # BLD AUTO: 1.33 THOUSANDS/ÂΜL (ref 0.6–4.47)
LYMPHOCYTES # BLD AUTO: 1.33 THOUSANDS/ÂΜL (ref 0.6–4.47)
LYMPHOCYTES NFR BLD AUTO: 33 % (ref 14–44)
LYMPHOCYTES NFR BLD AUTO: 33 % (ref 14–44)
MCH RBC QN AUTO: 19.3 PG (ref 26.8–34.3)
MCH RBC QN AUTO: 19.3 PG (ref 26.8–34.3)
MCHC RBC AUTO-ENTMCNC: 27.4 G/DL (ref 31.4–37.4)
MCHC RBC AUTO-ENTMCNC: 27.4 G/DL (ref 31.4–37.4)
MCV RBC AUTO: 71 FL (ref 82–98)
MCV RBC AUTO: 71 FL (ref 82–98)
MONOCYTES # BLD AUTO: 0.33 THOUSAND/ÂΜL (ref 0.17–1.22)
MONOCYTES # BLD AUTO: 0.33 THOUSAND/ÂΜL (ref 0.17–1.22)
MONOCYTES NFR BLD AUTO: 8 % (ref 4–12)
MONOCYTES NFR BLD AUTO: 8 % (ref 4–12)
NEUTROPHILS # BLD AUTO: 2.21 THOUSANDS/ÂΜL (ref 1.85–7.62)
NEUTROPHILS # BLD AUTO: 2.21 THOUSANDS/ÂΜL (ref 1.85–7.62)
NEUTS SEG NFR BLD AUTO: 56 % (ref 43–75)
NEUTS SEG NFR BLD AUTO: 56 % (ref 43–75)
NONHDLC SERPL-MCNC: 59 MG/DL
NONHDLC SERPL-MCNC: 59 MG/DL
NRBC BLD AUTO-RTO: 0 /100 WBCS
NRBC BLD AUTO-RTO: 0 /100 WBCS
PLATELET # BLD AUTO: 271 THOUSANDS/UL (ref 149–390)
PLATELET # BLD AUTO: 271 THOUSANDS/UL (ref 149–390)
PMV BLD AUTO: 12.1 FL (ref 8.9–12.7)
PMV BLD AUTO: 12.1 FL (ref 8.9–12.7)
POTASSIUM SERPL-SCNC: 4.1 MMOL/L (ref 3.5–5.3)
POTASSIUM SERPL-SCNC: 4.1 MMOL/L (ref 3.5–5.3)
PROT SERPL-MCNC: 7 G/DL (ref 6.4–8.4)
PROT SERPL-MCNC: 7 G/DL (ref 6.4–8.4)
RBC # BLD AUTO: 3.67 MILLION/UL (ref 3.81–5.12)
RBC # BLD AUTO: 3.67 MILLION/UL (ref 3.81–5.12)
SODIUM SERPL-SCNC: 138 MMOL/L (ref 135–147)
SODIUM SERPL-SCNC: 138 MMOL/L (ref 135–147)
TRIGL SERPL-MCNC: 30 MG/DL
TRIGL SERPL-MCNC: 30 MG/DL
TSH SERPL DL<=0.05 MIU/L-ACNC: 1.73 UIU/ML (ref 0.45–4.5)
TSH SERPL DL<=0.05 MIU/L-ACNC: 1.73 UIU/ML (ref 0.45–4.5)
WBC # BLD AUTO: 3.99 THOUSAND/UL (ref 4.31–10.16)
WBC # BLD AUTO: 3.99 THOUSAND/UL (ref 4.31–10.16)

## 2023-02-12 NOTE — TELEPHONE ENCOUNTER
Clerical to schedule F/U Labs - Anemia appt within the next 2 weeks  Nurse will be calling lab 2/13/23 to add on additional tests to current specimen

## 2023-02-13 LAB
HIV 1+2 AB+HIV1 P24 AG SERPL QL IA: NORMAL
HIV 1+2 AB+HIV1 P24 AG SERPL QL IA: NORMAL
HIV 2 AB SERPL QL IA: NORMAL
HIV 2 AB SERPL QL IA: NORMAL
HIV1 AB SERPL QL IA: NORMAL
HIV1 AB SERPL QL IA: NORMAL
HIV1 P24 AG SERPL QL IA: NORMAL
HIV1 P24 AG SERPL QL IA: NORMAL

## 2023-02-14 PROBLEM — D50.9 IRON DEFICIENCY ANEMIA: Status: ACTIVE | Noted: 2023-02-14

## 2023-02-14 LAB
FERRITIN SERPL-MCNC: 2 NG/ML (ref 8–388)
FERRITIN SERPL-MCNC: 2 NG/ML (ref 8–388)
IRON SERPL-MCNC: 15 UG/DL (ref 50–170)
IRON SERPL-MCNC: 15 UG/DL (ref 50–170)
RETICS # AUTO: ABNORMAL 10*3/UL (ref 14097–95744)
RETICS # AUTO: ABNORMAL 10*3/UL (ref 14097–95744)
RETICS # CALC: 2 % (ref 0.37–1.87)
RETICS # CALC: 2 % (ref 0.37–1.87)
TIBC SERPL-MCNC: 503 UG/DL (ref 250–450)
TIBC SERPL-MCNC: 503 UG/DL (ref 250–450)

## 2023-02-14 NOTE — RESULT ENCOUNTER NOTE
Unstable labs - will review with patient at upcoming appointment  Iron Deficiency Anemia - Start sulfate 325 mg twice daily and Colace 100 mg twice daily as needed for constipation

## 2023-02-15 ENCOUNTER — TELEMEDICINE (OUTPATIENT)
Dept: FAMILY MEDICINE CLINIC | Facility: CLINIC | Age: 28
End: 2023-02-15

## 2023-02-15 VITALS — WEIGHT: 117 LBS | HEIGHT: 64 IN | BODY MASS INDEX: 19.97 KG/M2

## 2023-02-15 DIAGNOSIS — D72.819 LEUKOPENIA, UNSPECIFIED TYPE: ICD-10-CM

## 2023-02-15 DIAGNOSIS — R61 NIGHT SWEATS: ICD-10-CM

## 2023-02-15 DIAGNOSIS — D50.9 IRON DEFICIENCY ANEMIA, UNSPECIFIED IRON DEFICIENCY ANEMIA TYPE: Primary | ICD-10-CM

## 2023-02-15 DIAGNOSIS — K62.5 RECTAL BLEEDING: ICD-10-CM

## 2023-02-15 DIAGNOSIS — R71.8 ABNORMAL RBC: ICD-10-CM

## 2023-02-15 DIAGNOSIS — K59.00 CONSTIPATION, UNSPECIFIED CONSTIPATION TYPE: ICD-10-CM

## 2023-02-15 NOTE — PATIENT INSTRUCTIONS
Iron Deficiency Anemia - Start ferrous sulfate 325 mg twice daily and Colace 100 mg twice daily as needed for constipation  Low vitamin D - Recommend start multivitamin and over-the-counter vitamin D3 1000 - 3000 International Units daily  Iron Rich Diet   AMBULATORY CARE:   An iron-rich diet  includes foods that are good sources of iron  People need extra iron during childhood, adolescence (teenage years), and pregnancy  Iron is a mineral that your body needs to make hemoglobin  Hemoglobin is part of your blood and helps carry oxygen from your lungs to the rest of your body  Eat iron-rich foods and vitamin C every day to prevent iron deficiency anemia  Iron deficiency anemia can lead to other health problems in adults and growth or development problems in children  Daily iron needs:   Males:      3to 1years old: 7 mg    3to 6years old: 10 mg    5to 15years old: 8 mg    15to 25years old: 11 mg    19 years and older: 8 mg    Females:      3to 1years old: 7 mg    3to 6years old: 10 mg    5to 15years old: 8 mg    15to 25years old: 15 mg    19 to 50 years: 18 mg    Over 46years old: 8 mg    Pregnant women:  27 mg    Foods that contain iron:   Meat, fish, and poultry are good sources of iron  They contain heme iron, a form of iron that your body absorbs very well  Fruit, vegetables, eggs, and grains such as pasta, rice, and cereal also contain iron  They contain nonheme iron, a form of iron that is not absorbed as well as heme iron  You can absorb more iron from these foods by eating a food that is high in vitamin C at the same time  You can also absorb more nonheme iron by eating a food from the meat, fish, and poultry group at the same time  Fish and shellfish contain some mercury, a metal that can be harmful to the body  Children and unborn babies are at higher risk for harm caused by mercury  Children and pregnant women should avoid eating fish high in mercury, such as shark and swordfish  They should also eat only fish that are lower in mercury, such as salmon, canned light tuna, and catfish  Limit the amount of low-mercury fish and shellfish you eat to less than 12 ounces per week  Iron-rich foods:   Foods that contain 2 mg or more per serving:      3 ounces of cooked beef (radha, eye of round) or cooked turkey (dark meat)    ½ cup of beans (black, kidney, or lentil, or soybeans)    ½ cup of tofu    1 medium baked potato    1 cup of cooked artichoke or cooked spinach    ¾ cup of instant oatmeal    1 cup of corn flakes    Foods that contain 1 to 2 mg per serving:      3 ounces of chicken    3 ounces of pork    3 ounces of turkey (light meat)    3 ounces of light tuna    ½ cup of seedless, packed raisins    1 slice of whole-wheat or white bread       Good sources of vitamin C:  Eat a serving of vitamin C with any iron-rich food to help your body absorb more iron  The following fruits and vegetables are good sources of vitamin C:  1 cup of fresh orange juice (124 mg) or pink grapefruit juice (83 mg)    1 cup of strawberries (106 mg)    1 cup of diced cantaloupe (68 mg)    1 cup of sweet yellow pepper (283 mg)    1 cup of fresh, boiled broccoli (116 mg) or cooked brussels sprouts (97 mg)    1 cup of kale (53 mg)    1 cup of tomato juice (45 mg)       Other guidelines to follow:   Tea and coffee can decrease the amount of iron that your body absorbs from iron-rich foods  Drink coffee and tea separately from meals that contain iron-rich foods  Have foods and liquids high in calcium separately from iron-rich foods  Calcium prevents iron from being absorbed  Cow's milk and products made from it, such as cheese and yogurt, are high in calcium  Children older than 1 year only need about 24 ounces of cow's milk each day  Other foods high in calcium include leafy greens, green vegetables, almonds, and canned sardines         © Copyright [employer] 2022 Information is for End User's use only and may not be sold, redistributed or otherwise used for commercial purposes  All illustrations and images included in CareNotes® are the copyrighted property of A D A M , Inc  or Aliyah Garcia  The above information is an  only  It is not intended as medical advice for individual conditions or treatments  Talk to your doctor, nurse or pharmacist before following any medical regimen to see if it is safe and effective for you

## 2023-02-15 NOTE — PROGRESS NOTES
Virtual Regular Visit    Verification of patient location:    Patient is located in the following state in which I hold an active license [place]      Assessment/Plan:    Problem List Items Addressed This Visit        Other    Iron deficiency anemia - Primary     New  Start ferrous sulfate 325 mg twice daily and Colace 100 mg twice daily as needed for constipation  Monitor on oral Fe as patient already has baseline constipation  Pending Heme /Onc consult as she may benefit from IV iron  Increase dietary iron  Handout given  Previously referred to GI as will likely need colonoscopy for iron deficiency anemia, rectal bleeding, and constipation  Denies menometrorrhagia  Up to date with Gyn  Relevant Orders    Ambulatory Referral to Hematology / Oncology   Other Visit Diagnoses     Leukopenia, unspecified type        Relevant Orders    Ambulatory Referral to Hematology / Oncology    R/O Malignancy  Abnormal RBC        Relevant Orders    Ambulatory Referral to Hematology / Oncology    R/O Malignancy  Night sweats        Relevant Orders    Ambulatory Referral to Hematology / Oncology    R/O Malignancy  Constipation, unspecified constipation type        See above  Rectal bleeding        See above  Reason for visit is   Chief Complaint   Patient presents with   • Virtual Regular Visit     Review recent labs           Encounter provider Nicci Fox DO    Provider located at [address] [place] [id number] [phone]      Recent Visits  Date Type Provider Dept   02/12/23 Telephone DO Valerio Hurd [facility]   02/08/23 Office Visit DO Valerio Hurd [facility]   Showing recent visits within past 7 days and meeting all other requirements  Today's Visits  Date Type Provider Dept   02/15/23 Telemedicine DO Valerio Hurd [address] [place] [address] today's visits and meeting all other requirements  Future Appointments  No visits were found meeting these conditions  Showing future appointments within next 150 days and meeting all other requirements       The patient was identified by name and date of birth  Amy Burr was informed that this is a telemedicine visit and that the visit is being conducted through the [employer]  She agrees to proceed     My office door was closed  No one else was in the room  She acknowledged consent and understanding of privacy and security of the video platform  The patient has agreed to participate and understands they can discontinue the visit at any time  Patient is aware this is a billable service  Subjective  Amy Burr is a 28 y o  female    HPI     Return in about 1 year (around 2/8/2024) for Annual physical; PRN Labs  Watching diet  +Exercise - Running / Walking / Weightlifting for 60 minutes, 4-5 times per week  Iron Deficiency Anemia - Dx 2/12/23  She is eating iron rich foods and eats meat once weekly  Patient already has constipation and rectal bleeding, and was referred to GI 2/8/23  Menses are regular and light  LMP 2/8/23  Reviewed:  Labs 2/12/23, Gyn 2/1/23    Sees Gyn Ms  Ky Dixon PA-C at [facility] yearly  Next appt 2/24  Past Medical History:   Diagnosis Date   • Abnormal Pap smear of cervix    • Iron deficiency anemia 02/14/2023       Past Surgical History:   Procedure Laterality Date   • COLPOSCOPY  2020       Current Outpatient Medications   Medication Sig Dispense Refill   • clobetasol (TEMOVATE) 0 05 % ointment Apply topically 2 (two) times a day (Patient taking differently: Apply 1 application topically 2 (two) times a day Rx per Gyn) 30 g 0     No current facility-administered medications for this visit          Allergies   Allergen Reactions   • Amoxicillin Rash       Review of Systems   Constitutional: Positive for diaphoresis (Nightsweats intermittent)  Negative for appetite change, chills, fatigue and fever  Respiratory: Negative for chest tightness and shortness of breath  Cardiovascular: Negative for chest pain and palpitations  Gastrointestinal: Positive for constipation  Negative for abdominal pain, blood in stool, diarrhea, nausea and vomiting  Genitourinary: Negative for dysuria  Video Exam    Vitals:    02/15/23 1416   Weight: 53 1 kg (117 lb)   Height: 5' 4" (1 626 m)       Physical Exam  Vitals and nursing note reviewed  Constitutional:       General: She is not in acute distress  Appearance: Normal appearance  She is well-developed and normal weight  She is not diaphoretic  HENT:      Head: Normocephalic and atraumatic  Right Ear: External ear normal       Left Ear: External ear normal       Nose: Nose normal       Mouth/Throat:      Mouth: Mucous membranes are moist    Eyes:      General: No scleral icterus  Right eye: No discharge  Left eye: No discharge  Extraocular Movements: Extraocular movements intact  Conjunctiva/sclera: Conjunctivae normal    Neck:      Thyroid: No thyromegaly  Pulmonary:      Effort: Pulmonary effort is normal  No respiratory distress  Breath sounds: No stridor  No wheezing  Abdominal:      Palpations: Abdomen is soft  Tenderness: There is no abdominal tenderness  There is no guarding or rebound  Musculoskeletal:         General: No swelling or tenderness  Cervical back: Normal range of motion  Right lower leg: No edema  Left lower leg: No edema  Skin:     Findings: No rash  Neurological:      General: No focal deficit present  Mental Status: She is alert and oriented to person, place, and time     Psychiatric:         Mood and Affect: Mood normal           I spent 17 minutes directly with the patient during this visit

## 2023-02-15 NOTE — ASSESSMENT & PLAN NOTE
New   Start ferrous sulfate 325 mg twice daily and Colace 100 mg twice daily as needed for constipation  Monitor on oral Fe as patient already has baseline constipation  Pending Heme /Onc consult as she may benefit from IV iron  Increase dietary iron  Handout given  Previously referred to GI as will likely need colonoscopy for iron deficiency anemia, rectal bleeding, and constipation  Denies menometrorrhagia    Up to date with Gyn

## 2023-02-16 ENCOUNTER — TELEPHONE (OUTPATIENT)
Dept: HEMATOLOGY ONCOLOGY | Facility: CLINIC | Age: 28
End: 2023-02-16

## 2023-02-16 NOTE — TELEPHONE ENCOUNTER
Patient has a referral on file - Made an attempt to schedule a new patient consultation  A voicemail was left making patient aware of their referral and instructing them to call back at the [facility] phone number in order to schedule their consultation

## 2023-02-17 LAB
EST. AVERAGE GLUCOSE BLD GHB EST-MCNC: 103 MG/DL
EST. AVERAGE GLUCOSE BLD GHB EST-MCNC: 103 MG/DL
HBA1C MFR BLD: 5.2 %
HBA1C MFR BLD: 5.2 %

## 2023-02-19 NOTE — RESULT ENCOUNTER NOTE
Normal hemoglobin A1c  No signs of prediabetes or diabetes  As patient has anemia, this reading may not be very reliable, but fasting blood sugar was also normal, which is reassuring  Continue plan of care        Message sent to patient via [facility] patient portal

## 2023-03-03 ENCOUNTER — CONSULT (OUTPATIENT)
Dept: HEMATOLOGY ONCOLOGY | Facility: CLINIC | Age: 28
End: 2023-03-03

## 2023-03-03 ENCOUNTER — APPOINTMENT (OUTPATIENT)
Dept: LAB | Facility: CLINIC | Age: 28
End: 2023-03-03

## 2023-03-03 ENCOUNTER — TELEPHONE (OUTPATIENT)
Dept: HEMATOLOGY ONCOLOGY | Facility: CLINIC | Age: 28
End: 2023-03-03

## 2023-03-03 VITALS
BODY MASS INDEX: 20.14 KG/M2 | TEMPERATURE: 97.6 F | HEART RATE: 69 BPM | SYSTOLIC BLOOD PRESSURE: 116 MMHG | OXYGEN SATURATION: 99 % | WEIGHT: 118 LBS | DIASTOLIC BLOOD PRESSURE: 64 MMHG | RESPIRATION RATE: 16 BRPM | HEIGHT: 64 IN

## 2023-03-03 DIAGNOSIS — D50.0 IRON DEFICIENCY ANEMIA DUE TO CHRONIC BLOOD LOSS: ICD-10-CM

## 2023-03-03 DIAGNOSIS — D50.9 MICROCYTIC HYPOCHROMIC ANEMIA: Primary | ICD-10-CM

## 2023-03-03 DIAGNOSIS — D50.8 IRON DEFICIENCY ANEMIA SECONDARY TO INADEQUATE DIETARY IRON INTAKE: ICD-10-CM

## 2023-03-03 DIAGNOSIS — D72.819 LEUKOPENIA, UNSPECIFIED TYPE: ICD-10-CM

## 2023-03-03 DIAGNOSIS — R61 NIGHT SWEATS: ICD-10-CM

## 2023-03-03 LAB
ALBUMIN SERPL BCP-MCNC: 4.4 G/DL (ref 3.5–5)
ALP SERPL-CCNC: 38 U/L (ref 34–104)
ALT SERPL W P-5'-P-CCNC: 14 U/L (ref 7–52)
ANION GAP SERPL CALCULATED.3IONS-SCNC: 7 MMOL/L (ref 4–13)
AST SERPL W P-5'-P-CCNC: 20 U/L (ref 13–39)
BASOPHILS # BLD AUTO: 0.04 THOUSANDS/ÂΜL (ref 0–0.1)
BASOPHILS NFR BLD AUTO: 1 % (ref 0–1)
BILIRUB SERPL-MCNC: 0.56 MG/DL (ref 0.2–1)
BUN SERPL-MCNC: 16 MG/DL (ref 5–25)
CALCIUM SERPL-MCNC: 9.3 MG/DL (ref 8.4–10.2)
CHLORIDE SERPL-SCNC: 107 MMOL/L (ref 96–108)
CO2 SERPL-SCNC: 25 MMOL/L (ref 21–32)
CREAT SERPL-MCNC: 0.74 MG/DL (ref 0.6–1.3)
EOSINOPHIL # BLD AUTO: 0.05 THOUSAND/ÂΜL (ref 0–0.61)
EOSINOPHIL NFR BLD AUTO: 1 % (ref 0–6)
FERRITIN SERPL-MCNC: 10 NG/ML (ref 8–388)
FOLATE SERPL-MCNC: 17.4 NG/ML (ref 3.1–17.5)
GFR SERPL CREATININE-BSD FRML MDRD: 110 ML/MIN/1.73SQ M
GLUCOSE SERPL-MCNC: 83 MG/DL (ref 65–140)
HCT VFR BLD AUTO: 33.9 % (ref 34.8–46.1)
HGB BLD-MCNC: 9.5 G/DL (ref 11.5–15.4)
IMM GRANULOCYTES # BLD AUTO: 0.02 THOUSAND/UL (ref 0–0.2)
IMM GRANULOCYTES NFR BLD AUTO: 0 % (ref 0–2)
IRON SATN MFR SERPL: 10 % (ref 15–50)
IRON SERPL-MCNC: 42 UG/DL (ref 50–170)
LYMPHOCYTES # BLD AUTO: 1.13 THOUSANDS/ÂΜL (ref 0.6–4.47)
LYMPHOCYTES NFR BLD AUTO: 19 % (ref 14–44)
MCH RBC QN AUTO: 22.2 PG (ref 26.8–34.3)
MCHC RBC AUTO-ENTMCNC: 28 G/DL (ref 31.4–37.4)
MCV RBC AUTO: 79 FL (ref 82–98)
MONOCYTES # BLD AUTO: 0.37 THOUSAND/ÂΜL (ref 0.17–1.22)
MONOCYTES NFR BLD AUTO: 6 % (ref 4–12)
NEUTROPHILS # BLD AUTO: 4.44 THOUSANDS/ÂΜL (ref 1.85–7.62)
NEUTS SEG NFR BLD AUTO: 73 % (ref 43–75)
NRBC BLD AUTO-RTO: 0 /100 WBCS
PLATELET # BLD AUTO: 364 THOUSANDS/UL (ref 149–390)
PMV BLD AUTO: 10.1 FL (ref 8.9–12.7)
POTASSIUM SERPL-SCNC: 4.4 MMOL/L (ref 3.5–5.3)
PROT SERPL-MCNC: 6.3 G/DL (ref 6.4–8.4)
RBC # BLD AUTO: 4.28 MILLION/UL (ref 3.81–5.12)
SODIUM SERPL-SCNC: 139 MMOL/L (ref 135–147)
TIBC SERPL-MCNC: 424 UG/DL (ref 250–450)
VIT B12 SERPL-MCNC: 465 PG/ML (ref 100–900)
WBC # BLD AUTO: 6.05 THOUSAND/UL (ref 4.31–10.16)

## 2023-03-03 RX ORDER — OMEGA-3S/DHA/EPA/FISH OIL/D3 300MG-1000
400 CAPSULE ORAL DAILY
COMMUNITY

## 2023-03-03 RX ORDER — SODIUM CHLORIDE 9 MG/ML
20 INJECTION, SOLUTION INTRAVENOUS ONCE
OUTPATIENT
Start: 2023-03-13

## 2023-03-03 RX ORDER — FERROUS SULFATE 325(65) MG
325 TABLET ORAL
COMMUNITY

## 2023-03-03 RX ORDER — MULTIVIT-MIN/IRON FUM/FOLIC AC 7.5 MG-4
1 TABLET ORAL DAILY
COMMUNITY

## 2023-03-03 NOTE — PROGRESS NOTES
[facility] [address] [address]  Hematology Ambulatory Consult  Cesia Abdalla, 1995, [id number]  3/3/2023    Assessment/Plan:  1  Microcytic hypochromic anemia  2  Iron deficiency anemia secondary to inadequate dietary iron intake  3  Iron deficiency anemia due to chronic blood loss  Ms Jaycee Wild is a 26-year-old female seen in consultation for iron deficiency anemia  She was found to have a hemoglobin of 7 1 and a ferritin of 2 on routine labs  She does not have any significant symptoms associated with this and does not note a recent change in any of her symptoms, daily activities, or routine  She has no evidence of chronic blood loss other than her mild menstrual cycle  She denies any hematuria or melena  She was recently started on oral iron and has experienced some abdominal bloating but otherwise doing okay and has not noted a significant improvement in any thing  We discussed that she will go for repeat labs today after leaving the office and if her ferritin remains less than 30 IV iron would be recommended  If her ferritin is greater than 30 we will continue on oral iron and repeat labs in 1 month  We also discussed assessing for B12 and folate deficiency as well  She did sign blood consent and I explained that if her hemoglobin is below 7 a blood transfusion would be recommended  IV iron was recommended  Two types of IV iron were discussed: Venofer and Feraheme  Patient understands dosing and schedule differences between the two medications  If Seleta Ill is not covered by insurance, we will automatically supplement with Venofer  We discussed potential side effects which include but are not limited to IV site reactions, tattooing, hypotension, arthralgias, headache, nausea, and anaphylaxis    If patient experiences any side effects they are to call the office to discuss premedications are necessary for subsequent dosing       - Ambulatory Referral to Hematology / Oncology  - CBC and differential; Future  - Vitamin B12; Future  - Methylmalonic acid, serum; Future  - Folate; Future  - Iron Panel (Includes Ferritin, Iron Sat%, Iron, and TIBC); Future  - Comprehensive metabolic panel; Future  - Blood Bank Hold Tube; Future    4  Leukopenia, unspecified type  5  Night sweats  White blood cell count was mildly low at 3 99 with a completely normal differential   She states that the night sweats only happen specifically the week before her menstrual cycle and believe it is definitely cycle hormone related  She has no other B symptoms  We will continue to monitor on subsequent labs  - Ambulatory Referral to Hematology / Oncology        The patient is scheduled for follow-up in approximately 3 months  Patient voiced agreement and understanding to the above  Patient knows to call the Hematology/Oncology office with any questions and concerns regarding the above  Barrier(s) to care: None  The patient is able to self care     -------------------------------------------------------------------------------------------------------    Chief Complaint   Patient presents with   • Consult       Referring provider:  DO Griffin Lorenzo 5  [address]  [address]    History of present illness:  Carrie Gallagher is a 21-year-old female with no significant past medical history seen in consultation for iron deficiency anemia  She had routine labs completed after transitioning her care to [facility] was found to have a hemoglobin of 7 1 with a ferritin of 2  She does not have significant symptoms associated with this and is generally feeling well  She has no history of iron deficiency or anemia previously though there are no historical labs to review  She denies ever being told that she or a family member is diagnosed with thalassemia  She denies history of gastric bypass or colon resection  She has never had a colonoscopy or EGD  She denies pagophagia, pica, thinning hair, or pallor  She denies coffee ground stools, tarry stools, bright red blood per rectum, hematuria, or menorrhagia  Her menstrual cycle is regular every month lasting 4 to 5 days and is very mild  She denies shortness of breath, OSPINA, palpitations, or chest pain  She denies fevers, chills, unintentional weight loss, abdominal pain/distension, nausea, vomiting, constipation, diarrhea, petechiae/purpura, unexplained ecchymosis, or LE swelling  She has been taking oral iron for the last 2 to 3 weeks which has caused her abdominal discomfort and bloating  Her current symptoms include restless legs which she has had all her life, brittle nails, mild fatigue, and orthostatic dizziness/lightheadedness  She is not vegetarian but does not eat much animal protein  2/12/2023: Hemoglobin 7 1, MCV 71, platelets 385, WBC 8 77   Ferritin 2, TIBC 503, serum iron 15    Review of Systems   Constitutional: Positive for fatigue  Negative for activity change, appetite change, diaphoresis, fever and unexpected weight change  HENT: Negative for trouble swallowing and voice change  Eyes: Negative for photophobia and visual disturbance  Respiratory: Negative for cough, chest tightness and shortness of breath  Cardiovascular: Negative for chest pain, palpitations and leg swelling  Gastrointestinal: Positive for abdominal distention  Negative for abdominal pain, anal bleeding, blood in stool, constipation, diarrhea, nausea and vomiting  Endocrine: Negative for cold intolerance and heat intolerance  Genitourinary: Negative for dysuria, hematuria and urgency  Musculoskeletal: Negative for arthralgias, back pain, gait problem and joint swelling  Restless legs   Skin: Negative for pallor and rash  Brittle nails   Neurological: Positive for dizziness and light-headedness  Negative for weakness, numbness and headaches  Hematological: Negative for adenopathy  Does not bruise/bleed easily  Psychiatric/Behavioral: Negative for confusion and sleep disturbance  Patient Active Problem List   Diagnosis   • Cervical intraepithelial neoplasia grade 1 and grade 2   • Iron deficiency anemia       Past Medical History:   Diagnosis Date   • Abnormal Pap smear of cervix    • Iron deficiency anemia 02/14/2023       Past Surgical History:   Procedure Laterality Date   • COLPOSCOPY  2020       Family History   Problem Relation Age of Onset   • No Known Problems Mother    • Hypertension Father    • Birth defects Sister         Born without arms   • No Known Problems Brother    • Breast cancer Maternal Grandmother    • Stroke Maternal Grandfather        Social History     Socioeconomic History   • Marital status: Unknown     Spouse name: None   • Number of children: 0   • Years of education: None   • Highest education level: None   Occupational History   • Occupation: Speech Therapist at [employer]   Tobacco Use   • Smoking status: Never   • Smokeless tobacco: Never   Vaping Use   • Vaping Use: Never used   Substance and Sexual Activity   • Alcohol use:  Yes     Alcohol/week: 2 0 standard drinks     Types: 2 Cans of beer per week     Comment: Drinks alcohol socially   • Drug use: Never   • Sexual activity: Yes     Partners: Male     Birth control/protection: Condom Male   Other Topics Concern   • None   Social History Narrative    Single - Engaged to be  9/23    Lives with fiance    No children    Speech Therapist at [employer]     Social Determinants of Health     Financial Resource Strain: Not on file   Food Insecurity: Not on file   Transportation Needs: Not on file   Physical Activity: Not on file   Stress: Not on file   Social Connections: Not on file   Intimate Partner Violence: Not on file   Housing Stability: Not on file         Current Outpatient Medications: •  cholecalciferol (VITAMIN D3) 400 units tablet, Take 400 Units by mouth daily, Disp: , Rfl:   •  clobetasol (TEMOVATE) 0 05 % ointment, Apply topically 2 (two) times a day (Patient taking differently: Apply 1 application  topically 2 (two) times a day Rx per Gyn), Disp: 30 g, Rfl: 0  •  ferrous sulfate 325 (65 Fe) mg tablet, Take 325 mg by mouth daily with breakfast, Disp: , Rfl:   •  Multiple Vitamins-Minerals (multivitamin with minerals) tablet, Take 1 tablet by mouth daily, Disp: , Rfl:     Allergies   Allergen Reactions   • Amoxicillin Rash       Objective:  /64 (BP Location: Left arm, Patient Position: Sitting, Cuff Size: Adult)   Pulse 69   Temp 97 6 °F (36 4 °C) (Temporal)   Resp 16   Ht 5' 4" (1 626 m)   Wt 53 5 kg (118 lb)   SpO2 99%   BMI 20 25 kg/m²   Physical Exam  Constitutional:       General: She is not in acute distress  Appearance: Normal appearance  She is normal weight  She is not ill-appearing  HENT:      Head: Normocephalic and atraumatic  Eyes:      Extraocular Movements: Extraocular movements intact  Conjunctiva/sclera: Conjunctivae normal       Pupils: Pupils are equal, round, and reactive to light  Cardiovascular:      Rate and Rhythm: Normal rate and regular rhythm  Pulses: Normal pulses  Heart sounds: Normal heart sounds  No murmur heard  Pulmonary:      Effort: Pulmonary effort is normal  No respiratory distress  Breath sounds: Normal breath sounds  Abdominal:      General: Abdomen is flat  Bowel sounds are normal  There is no distension  Palpations: Abdomen is soft  Tenderness: There is no abdominal tenderness  Musculoskeletal:         General: Normal range of motion  Cervical back: Normal range of motion  No tenderness  Right lower leg: No edema  Left lower leg: No edema  Lymphadenopathy:      Cervical: No cervical adenopathy  Skin:     General: Skin is warm and dry        Capillary Refill: Capillary refill takes less than 2 seconds  Coloration: Skin is not jaundiced or pale  Neurological:      General: No focal deficit present  Mental Status: She is alert and oriented to person, place, and time  Mental status is at baseline  Cranial Nerves: No cranial nerve deficit  Motor: No weakness  Gait: Gait normal    Psychiatric:         Mood and Affect: Mood normal          Behavior: Behavior normal          Thought Content: Thought content normal          Judgment: Judgment normal          Result Review  Labs:   Lab on 02/12/2023   Component Date Value Ref Range Status   • HIV-1 p24 Antigen 02/12/2023 Non-Reactive  Non-Reactive Final   • HIV-1 Antibody 02/12/2023 Non-Reactive  Non-Reactive Final   • HIV-2 Antibody 02/12/2023 Non-Reactive  Non-Reactive Final   • HIV Ag-Ab 5th Gen 02/12/2023 Non-Reactive  Non-Reactive Final    A Non-Reactive test result does not preclude the possibility of exposure or infection with HIV-1 and/or HIV-2  Non-Reactive results can occur if the quantity of marker present is below the detection limits or is not present during the stage of disease in which a sample is collected  Repeat testing should be considered where there is clinical suspicion of infection        • Hepatitis C Ab 02/12/2023 Non-reactive  Non-reactive Final   • WBC 02/12/2023 3 99 (L)  4 31 - 10 16 Thousand/uL Final   • RBC 02/12/2023 3 67 (L)  3 81 - 5 12 Million/uL Final   • Hemoglobin 02/12/2023 7 1 (L)  11 5 - 15 4 g/dL Final   • Hematocrit 02/12/2023 25 9 (L)  34 8 - 46 1 % Final   • MCV 02/12/2023 71 (L)  82 - 98 fL Final   • MCH 02/12/2023 19 3 (L)  26 8 - 34 3 pg Final   • MCHC 02/12/2023 27 4 (L)  31 4 - 37 4 g/dL Final   • RDW 02/12/2023 17 0 (H)  11 6 - 15 1 % Final   • MPV 02/12/2023 12 1  8 9 - 12 7 fL Final   • Platelets 57/21/4053 271  149 - 390 Thousands/uL Final   • nRBC 02/12/2023 0  /100 WBCs Final   • Neutrophils Relative 02/12/2023 56  43 - 75 % Final   • Immat GRANS % 02/12/2023 0  0 - 2 % Final   • Lymphocytes Relative 02/12/2023 33  14 - 44 % Final   • Monocytes Relative 02/12/2023 8  4 - 12 % Final   • Eosinophils Relative 02/12/2023 2  0 - 6 % Final   • Basophils Relative 02/12/2023 1  0 - 1 % Final   • Neutrophils Absolute 02/12/2023 2 21  1 85 - 7 62 Thousands/µL Final   • Immature Grans Absolute 02/12/2023 0 01  0 00 - 0 20 Thousand/uL Final   • Lymphocytes Absolute 02/12/2023 1 33  0 60 - 4 47 Thousands/µL Final   • Monocytes Absolute 02/12/2023 0 33  0 17 - 1 22 Thousand/µL Final   • Eosinophils Absolute 02/12/2023 0 08  0 00 - 0 61 Thousand/µL Final   • Basophils Absolute 02/12/2023 0 03  0 00 - 0 10 Thousands/µL Final   • Sodium 02/12/2023 138  135 - 147 mmol/L Final   • Potassium 02/12/2023 4 1  3 5 - 5 3 mmol/L Final   • Chloride 02/12/2023 111 (H)  96 - 108 mmol/L Final   • CO2 02/12/2023 24  21 - 32 mmol/L Final   • ANION GAP 02/12/2023 3 (L)  4 - 13 mmol/L Final   • BUN 02/12/2023 12  5 - 25 mg/dL Final   • Creatinine 02/12/2023 0 66  0 60 - 1 30 mg/dL Final    Standardized to IDMS reference method   • Glucose, Fasting 02/12/2023 87  65 - 99 mg/dL Final    Specimen collection should occur prior to Sulfasalazine administration due to the potential for falsely depressed results  Specimen collection should occur prior to Sulfapyridine administration due to the potential for falsely elevated results  • Calcium 02/12/2023 9 0  8 3 - 10 1 mg/dL Final   • AST 02/12/2023 23  5 - 45 U/L Final    Specimen collection should occur prior to Sulfasalazine administration due to the potential for falsely depressed results  • ALT 02/12/2023 25  12 - 78 U/L Final    Specimen collection should occur prior to Sulfasalazine and/or Sulfapyridine administration due to the potential for falsely depressed results      • Alkaline Phosphatase 02/12/2023 38 (L)  46 - 116 U/L Final   • Total Protein 02/12/2023 7 0  6 4 - 8 4 g/dL Final   • Albumin 02/12/2023 4 0  3 5 - 5 0 g/dL Final   • Total Bilirubin 02/12/2023 0 51  0 20 - 1 00 mg/dL Final    Use of this assay is not recommended for patients undergoing treatment with eltrombopag due to the potential for falsely elevated results  • eGFR 02/12/2023 120  ml/min/1 73sq m Final   • Cholesterol 02/12/2023 132  See Comment mg/dL Final    Cholesterol:         Pediatric <18 Years        Desirable          <170 mg/dL      Borderline High    170-199 mg/dL      High               >=200 mg/dL        Adult >=18 Years            Desirable         <200 mg/dL      Borderline High   200-239 mg/dL      High              >239 mg/dL     • Triglycerides 02/12/2023 30  See Comment mg/dL Final    Triglyceride:     0-9Y            <75mg/dL     10Y-17Y         <90 mg/dL       >=18Y     Normal          <150 mg/dL     Borderline High 150-199 mg/dL     High            200-499 mg/dL        Very High       >499 mg/dL    Specimen collection should occur prior to N-Acetylcysteine or Metamizole administration due to the potential for falsely depressed results  • HDL, Direct 02/12/2023 73  >=50 mg/dL Final    Specimen collection should occur prior to Metamizole administration due to the potential for falsley depressed results  • LDL Calculated 02/12/2023 53  0 - 100 mg/dL Final    LDL Cholesterol:     Optimal           <100 mg/dl     Near Optimal      100-129 mg/dl     Above Optimal       Borderline High 130-159 mg/dl       High            160-189 mg/dl       Very High       >189 mg/dl         This screening LDL is a calculated result  It does not have the accuracy of the Direct Measured LDL in the monitoring of patients with hyperlipidemia and/or statin therapy  Direct Measure LDL (UDE824) must be ordered separately in these patients     • Non-HDL-Chol (CHOL-HDL) 02/12/2023 59  mg/dl Final   • TSH 3RD GENERATON 02/12/2023 1 730  0 450 - 4 500 uIU/mL Final    The recommended reference ranges for TSH during pregnancy are as follows:   First trimester 0 1 to 2 5 uIU/mL   Second trimester  0 2 to 3 0 uIU/mL   Third trimester 0 3 to 3 0 uIU/m    Note: Normal ranges may not apply to patients who are transgender, non-binary, or whose legal sex, sex at birth, and gender identity differ  Adult TSH (3rd generation) reference range follows the recommended guidelines of the American Thyroid Association, January, 2020  • Hemoglobin A1C 02/12/2023 5 2  Normal 3 8-5 6%; PreDiabetic 5 7-6 4%; Diabetic >=6 5%; Glycemic control for adults with diabetes <7 0% % Final   • EAG 02/12/2023 103  mg/dl Final   • Vit D, 25-Hydroxy 02/12/2023 23 1 (L)  30 0 - 100 0 ng/mL Final   • LDL Direct 02/12/2023 50  0 - 100 mg/dl Final   • Iron 02/12/2023 15 (L)  50 - 170 ug/dL Final    Patients treated with metal-binding drugs (ie  Deferoxamine) may have depressed iron values  • TIBC 02/12/2023 503 (H)  250 - 450 ug/dL Final   • Ferritin 02/12/2023 2 (L)  8 - 388 ng/mL Final   • Retic Ct Abs 02/12/2023 74,200  14,097 - 95,744 Final   • Retic Ct Pct 02/12/2023 2 00 (H)  0 37 - 1 87 % Final       Imaging:  No relevant imaging to review      Please note: This report has been generated by a voice recognition software system  Therefore there may be syntax, spelling, and/or grammatical errors  Please call if you have any questions

## 2023-03-03 NOTE — TELEPHONE ENCOUNTER
Schedule IV iron     While we try to accommodate patient requests, our priority is to schedule treatment according to Doctor's orders and site availability  1  Does the Provider use the intake sheet or checkout note? Yes  2  What would be a preferred day of the week that would work best for your infusion appointment? Friday  3  Do you prefer mornings or afternoons for your appointments? Morning  4  Are there any days or dates that do not work for your schedule, including any upcoming vacations? No  5  We are going to try our best to schedule you at the infusion center closest to your home  In the event that we are unable to what would be your next preferred infusion site or sites? 1  [facility]  2  [facility]    6  Do you have transportation to take you to all of your appointments? Yes  7   Would you like the infusion center to draw labs from your port? (disregard if patient doesn't have a port or need labs for infusion appointment) No

## 2023-03-07 LAB — METHYLMALONATE SERPL-SCNC: 219 NMOL/L (ref 0–378)

## 2023-03-17 ENCOUNTER — HOSPITAL ENCOUNTER (OUTPATIENT)
Dept: INFUSION CENTER | Facility: HOSPITAL | Age: 28
End: 2023-03-17
Attending: INTERNAL MEDICINE

## 2023-03-17 VITALS
HEART RATE: 72 BPM | WEIGHT: 117 LBS | TEMPERATURE: 98.1 F | SYSTOLIC BLOOD PRESSURE: 109 MMHG | DIASTOLIC BLOOD PRESSURE: 72 MMHG | BODY MASS INDEX: 20.08 KG/M2

## 2023-03-17 DIAGNOSIS — D50.0 IRON DEFICIENCY ANEMIA DUE TO CHRONIC BLOOD LOSS: Primary | ICD-10-CM

## 2023-03-17 RX ORDER — SODIUM CHLORIDE 9 MG/ML
20 INJECTION, SOLUTION INTRAVENOUS ONCE
Status: CANCELLED | OUTPATIENT
Start: 2023-03-24

## 2023-03-17 RX ORDER — SODIUM CHLORIDE 9 MG/ML
20 INJECTION, SOLUTION INTRAVENOUS ONCE
Status: COMPLETED | OUTPATIENT
Start: 2023-03-17 | End: 2023-03-17

## 2023-03-17 RX ADMIN — FERUMOXYTOL 510 MG: 510 INJECTION INTRAVENOUS at 10:48

## 2023-03-17 RX ADMIN — SODIUM CHLORIDE 20 ML/HR: 0.9 INJECTION, SOLUTION INTRAVENOUS at 10:47

## 2023-03-17 NOTE — PROGRESS NOTES
Feraheme completed without incident  PT declines AVS, aware of next appt at [facility] [place] infusion

## 2023-03-24 ENCOUNTER — HOSPITAL ENCOUNTER (OUTPATIENT)
Dept: INFUSION CENTER | Facility: CLINIC | Age: 28
End: 2023-03-24

## 2023-03-24 VITALS
TEMPERATURE: 98.6 F | HEART RATE: 55 BPM | SYSTOLIC BLOOD PRESSURE: 104 MMHG | RESPIRATION RATE: 20 BRPM | DIASTOLIC BLOOD PRESSURE: 67 MMHG | OXYGEN SATURATION: 100 %

## 2023-03-24 DIAGNOSIS — D50.0 IRON DEFICIENCY ANEMIA DUE TO CHRONIC BLOOD LOSS: Primary | ICD-10-CM

## 2023-03-24 RX ORDER — SODIUM CHLORIDE 9 MG/ML
20 INJECTION, SOLUTION INTRAVENOUS ONCE
Status: COMPLETED | OUTPATIENT
Start: 2023-03-24 | End: 2023-03-24

## 2023-03-24 RX ORDER — SODIUM CHLORIDE 9 MG/ML
20 INJECTION, SOLUTION INTRAVENOUS ONCE
Status: CANCELLED | OUTPATIENT
Start: 2023-03-24

## 2023-03-24 RX ADMIN — SODIUM CHLORIDE 20 ML/HR: 0.9 INJECTION, SOLUTION INTRAVENOUS at 08:20

## 2023-03-24 RX ADMIN — FERUMOXYTOL 510 MG: 510 INJECTION INTRAVENOUS at 08:28

## 2023-03-24 NOTE — PROGRESS NOTES
Patient here for second dosing of feraheme and is well, no c/o or changes to report  Patient currently off oral iron

## 2023-03-24 NOTE — PROGRESS NOTES
Patient tolerated treatment without incident and was discharged post, no c/o offered  Future dosing at physician's discretion

## 2023-03-24 NOTE — PATIENT INSTRUCTIONS
March 2023 Sunday Monday Tuesday Wednesday Thursday Friday Saturday                  1     2     3    CONSULT PG   1:45 PM   (60 min )   Eyak Joe, [address] [facility] [place]    LAB WALK IN   3:00 PM   (5 min )   AN [facility] LAB PSC CHAIR   [facility] [address] MOB 4                5     6     7     8     9     10     11                12     13     14     15     16     17    INF THERAPY PLAN  10:00 AM   (90 min )   BE INF CHAIR [address] [facility] 18            Cycle 1, Treatment 1    19     20     21     22     23     24    INF THERAPY PLAN   8:00 AM   (90 min )   AN INF CHAIR [facility] 25            Cycle 1, Treatment 2    26     27     28     29     30     31                            Treatment Details         3/17/2023 - Cycle 1, Treatment 1      Supportive Care: [facility] PROVIDER COMMUNICATION4, ferumoxytol Jose Strange)    3/24/2023 - Cycle 1, Treatment 2      Supportive Care: [facility], ferumoxytol Jose Strange)

## 2023-06-01 ENCOUNTER — TELEPHONE (OUTPATIENT)
Dept: HEMATOLOGY ONCOLOGY | Facility: CLINIC | Age: 28
End: 2023-06-01

## 2023-06-01 ENCOUNTER — APPOINTMENT (OUTPATIENT)
Dept: LAB | Facility: CLINIC | Age: 28
End: 2023-06-01
Payer: COMMERCIAL

## 2023-06-01 DIAGNOSIS — D50.8 IRON DEFICIENCY ANEMIA SECONDARY TO INADEQUATE DIETARY IRON INTAKE: ICD-10-CM

## 2023-06-01 DIAGNOSIS — D50.0 IRON DEFICIENCY ANEMIA DUE TO CHRONIC BLOOD LOSS: ICD-10-CM

## 2023-06-01 DIAGNOSIS — D50.8 IRON DEFICIENCY ANEMIA SECONDARY TO INADEQUATE DIETARY IRON INTAKE: Primary | ICD-10-CM

## 2023-06-01 LAB
BASOPHILS # BLD AUTO: 0.02 THOUSANDS/ÂΜL (ref 0–0.1)
BASOPHILS NFR BLD AUTO: 0 % (ref 0–1)
EOSINOPHIL # BLD AUTO: 0.02 THOUSAND/ÂΜL (ref 0–0.61)
EOSINOPHIL NFR BLD AUTO: 0 % (ref 0–6)
ERYTHROCYTE [DISTWIDTH] IN BLOOD BY AUTOMATED COUNT: 12.8 % (ref 11.6–15.1)
FERRITIN SERPL-MCNC: 83 NG/ML (ref 11–307)
HCT VFR BLD AUTO: 40.8 % (ref 34.8–46.1)
HGB BLD-MCNC: 13.3 G/DL (ref 11.5–15.4)
IMM GRANULOCYTES # BLD AUTO: 0.02 THOUSAND/UL (ref 0–0.2)
IMM GRANULOCYTES NFR BLD AUTO: 0 % (ref 0–2)
IRON SATN MFR SERPL: 21 % (ref 15–50)
IRON SERPL-MCNC: 71 UG/DL (ref 50–170)
LYMPHOCYTES # BLD AUTO: 1.45 THOUSANDS/ÂΜL (ref 0.6–4.47)
LYMPHOCYTES NFR BLD AUTO: 24 % (ref 14–44)
MCH RBC QN AUTO: 29.7 PG (ref 26.8–34.3)
MCHC RBC AUTO-ENTMCNC: 32.6 G/DL (ref 31.4–37.4)
MCV RBC AUTO: 91 FL (ref 82–98)
MONOCYTES # BLD AUTO: 0.46 THOUSAND/ÂΜL (ref 0.17–1.22)
MONOCYTES NFR BLD AUTO: 8 % (ref 4–12)
NEUTROPHILS # BLD AUTO: 3.97 THOUSANDS/ÂΜL (ref 1.85–7.62)
NEUTS SEG NFR BLD AUTO: 68 % (ref 43–75)
NRBC BLD AUTO-RTO: 0 /100 WBCS
PLATELET # BLD AUTO: 243 THOUSANDS/UL (ref 149–390)
PMV BLD AUTO: 10.1 FL (ref 8.9–12.7)
RBC # BLD AUTO: 4.48 MILLION/UL (ref 3.81–5.12)
TIBC SERPL-MCNC: 338 UG/DL (ref 250–450)
WBC # BLD AUTO: 5.94 THOUSAND/UL (ref 4.31–10.16)

## 2023-06-01 PROCEDURE — 36415 COLL VENOUS BLD VENIPUNCTURE: CPT

## 2023-06-01 PROCEDURE — 83550 IRON BINDING TEST: CPT

## 2023-06-01 PROCEDURE — 85025 COMPLETE CBC W/AUTO DIFF WBC: CPT

## 2023-06-01 PROCEDURE — 83540 ASSAY OF IRON: CPT

## 2023-06-01 PROCEDURE — 82728 ASSAY OF FERRITIN: CPT

## 2023-06-01 NOTE — TELEPHONE ENCOUNTER
LVM for patient to have labs drawn prior to her appointment tomorrow  Made her aware the orders are in the system and they do not need to be fasting so if she could go at some point today  [facility] number provided if she needs to reschedule

## 2023-06-02 ENCOUNTER — OFFICE VISIT (OUTPATIENT)
Dept: HEMATOLOGY ONCOLOGY | Facility: CLINIC | Age: 28
End: 2023-06-02

## 2023-06-02 VITALS
RESPIRATION RATE: 18 BRPM | BODY MASS INDEX: 19.46 KG/M2 | HEIGHT: 64 IN | WEIGHT: 114 LBS | HEART RATE: 64 BPM | OXYGEN SATURATION: 95 % | TEMPERATURE: 98.8 F | SYSTOLIC BLOOD PRESSURE: 108 MMHG | DIASTOLIC BLOOD PRESSURE: 60 MMHG

## 2023-06-02 DIAGNOSIS — D50.9 MICROCYTIC HYPOCHROMIC ANEMIA: ICD-10-CM

## 2023-06-02 DIAGNOSIS — D50.9 IRON DEFICIENCY ANEMIA, UNSPECIFIED IRON DEFICIENCY ANEMIA TYPE: Primary | ICD-10-CM

## 2023-06-02 NOTE — PROGRESS NOTES
[address] [facility] [address] [id number]  Hematology Ambulatory Follow-Up  Jhoana Tamayo, 1995, [id number]  6/2/2023    Assessment/Plan:    1  Iron deficiency anemia, unspecified iron deficiency anemia type  2  Microcytic hypochromic anemia  Ms Mike Benítez is a 23-year-old female seen in follow-up for iron deficiency anemia  She has no clear source of her iron deficiency was found to have a ferritin of 2 with a hemoglobin of 7 1 on routine labs  She received 2 doses of IV Feraheme and tolerated these well without any side effects  Her hemoglobin improved to now 13 3 with a ferritin of 83  She overall feels that she has more energy throughout the day and her mood is better  She has no complaints or concerns  We discussed that she has no significant GI symptoms that would explain her iron deficiency anemia as well as a very mild menstrual cycle  We will attempt to maintain her iron levels with increasing her dietary iron as well as continuing her women's multivitamin  She will repeat blood work in 2 months and then again in 4 months prior to follow-up with me in the office  We discussed that if we were unable to continue to control her iron with dietary/oral supplementation a GI referral will be placed  She knows to call the office if her symptoms worsen prior to her follow-up for sooner evaluation     - CBC and differential; Future  - Iron Panel (Includes Ferritin, Iron Sat%, Iron, and TIBC); Future  - Iron Panel (Includes Ferritin, Iron Sat%, Iron, and TIBC); Future  - CBC and differential; Future  - Comprehensive metabolic panel; Future  - Folate; Future  - Vitamin B12; Future  - Methylmalonic acid, serum; Future    The patient is scheduled for follow-up in approximately 4 months  Patient voiced agreement and understanding to the above   Patient knows to call the Hematology/Oncology office with any questions and concerns regarding the above  Barrier(s) to care: None  The patient is able to self care   ------------------------------------------------------------------------------------------------------    Chief Complaint   Patient presents with   • Follow-up       History of present illness:   Broderick Nagel is a 69-year-old female with no significant past medical history seen in follow-up for iron deficiency anemia  She was originally seen in consultation in March 2023 after having routine labs with a ferritin of 2 and hemoglobin of 7 1  At the time she did not have significant symptoms associated with this and was overall feeling well  There is no clear source of her iron deficiency as she has no significant GI problems or symptoms as well as her  Very mild lasting only 4 to 5 days/month  She does not tolerate high doses of oral iron due to GI upset  She has no history of iron deficiency or anemia previously though there are no historical labs to review  She denies ever being told that she or a family member is diagnosed with thalassemia  She denies history of gastric bypass or colon resection  She has never had a colonoscopy or EGD     2/12/2023: Hemoglobin 7 1, MCV 71, platelets 761, WBC 6 29              Ferritin 2, TIBC 503, serum iron 15  IV Feraheme x2: 3/17 & 3/24  6/1/2023: Hemoglobin 13 3, MCV 91, platelets 234, WBC 7 50    Ferritin 83, iron saturation 21%, TIBC 338, serum iron 71    Interval history: She tolerated IV iron well without any side effects  She does states she did note more energy throughout the day and improved overall mood  Review of Systems   Constitutional: Negative for activity change, appetite change, diaphoresis, fatigue, fever and unexpected weight change  HENT: Negative for trouble swallowing and voice change  Eyes: Negative for photophobia and visual disturbance  Respiratory: Negative for cough, chest tightness and shortness of breath      Cardiovascular: Negative for chest pain, palpitations and leg swelling  Gastrointestinal: Negative for abdominal distention, abdominal pain, anal bleeding, blood in stool, constipation, diarrhea, nausea and vomiting  Endocrine: Negative for cold intolerance and heat intolerance  Genitourinary: Negative for dysuria, hematuria and urgency  Musculoskeletal: Negative for arthralgias, back pain, gait problem and joint swelling  Skin: Negative for pallor and rash  Neurological: Negative for dizziness, weakness, light-headedness, numbness and headaches  Hematological: Negative for adenopathy  Does not bruise/bleed easily  Psychiatric/Behavioral: Negative for confusion and sleep disturbance  Patient Active Problem List   Diagnosis   • Cervical intraepithelial neoplasia grade 1 and grade 2   • Iron deficiency anemia       Past Medical History:   Diagnosis Date   • Abnormal Pap smear of cervix    • Iron deficiency anemia 02/14/2023       Past Surgical History:   Procedure Laterality Date   • COLPOSCOPY  2020       Family History   Problem Relation Age of Onset   • No Known Problems Mother    • Hypertension Father    • Birth defects Sister         Born without arms   • No Known Problems Brother    • Breast cancer Maternal Grandmother    • Stroke Maternal Grandfather        Social History     Socioeconomic History   • Marital status: Unknown     Spouse name: None   • Number of children: 0   • Years of education: None   • Highest education level: None   Occupational History   • Occupation: Speech Therapist at [employer]   Tobacco Use   • Smoking status: Never     Passive exposure: Never   • Smokeless tobacco: Never   Vaping Use   • Vaping Use: Never used   Substance and Sexual Activity   • Alcohol use:  Yes     Alcohol/week: 2 0 standard drinks of alcohol     Types: 2 Cans of beer per week     Comment: Drinks alcohol socially   • Drug use: Never   • Sexual activity: Yes     Partners: Male     Birth "control/protection: Condom Male   Other Topics Concern   • None   Social History Narrative    Single - Engaged to be  9/23    Lives with fiance    No children    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[place] [address]     Social Determinants of Health     Financial Resource Strain: Not on file   Food Insecurity: Not on file   Transportation Needs: Not on file   Physical Activity: Not on file   Stress: Not on file   Social Connections: Not on file   Intimate Partner Violence: Not on file   Housing Stability: Not on file         Current Outpatient Medications:   •  cholecalciferol (VITAMIN D3) 400 units tablet, Take 400 Units by mouth daily, Disp: , Rfl:   •  Multiple Vitamins-Minerals (multivitamin with minerals) tablet, Take 1 tablet by mouth daily, Disp: , Rfl:     Allergies   Allergen Reactions   • Amoxicillin Rash       Objective:  /60 (BP Location: Right arm, Patient Position: Sitting, Cuff Size: Adult)   Pulse 64   Temp 98 8 °F (37 1 °C) (Temporal)   Resp 18   Ht 5' 4\" (1 626 m)   Wt 51 7 kg (114 lb)   SpO2 95%   BMI 19 57 kg/m²    Physical Exam  Constitutional:       General: She is not in acute distress  Appearance: Normal appearance  She is normal weight  She is not ill-appearing  HENT:      Head: Normocephalic and atraumatic  Eyes:      Extraocular Movements: Extraocular movements intact  Conjunctiva/sclera: Conjunctivae normal       Pupils: Pupils are equal, round, and reactive to light  Cardiovascular:      Rate and Rhythm: Normal rate and regular rhythm  Pulses: Normal pulses  Heart sounds: Normal heart sounds  No murmur heard  Pulmonary:      Effort: Pulmonary effort is normal  No respiratory distress  Breath sounds: Normal breath sounds  Abdominal:      General: Abdomen is flat  Bowel sounds are normal  There is no distension  Palpations: Abdomen is soft  Tenderness: There is no abdominal tenderness     Musculoskeletal:         " General: Normal range of motion  Cervical back: Normal range of motion  No tenderness  Right lower leg: No edema  Left lower leg: No edema  Lymphadenopathy:      Cervical: No cervical adenopathy  Skin:     General: Skin is warm and dry  Capillary Refill: Capillary refill takes less than 2 seconds  Coloration: Skin is not jaundiced or pale  Neurological:      General: No focal deficit present  Mental Status: She is alert and oriented to person, place, and time  Mental status is at baseline  Cranial Nerves: No cranial nerve deficit  Motor: No weakness  Gait: Gait normal    Psychiatric:         Mood and Affect: Mood normal          Behavior: Behavior normal          Thought Content:  Thought content normal          Judgment: Judgment normal          Result Review  Labs:  Appointment on 06/01/2023   Component Date Value Ref Range Status   • WBC 06/01/2023 5 94  4 31 - 10 16 Thousand/uL Final   • RBC 06/01/2023 4 48  3 81 - 5 12 Million/uL Final   • Hemoglobin 06/01/2023 13 3  11 5 - 15 4 g/dL Final   • Hematocrit 06/01/2023 40 8  34 8 - 46 1 % Final   • MCV 06/01/2023 91  82 - 98 fL Final   • MCH 06/01/2023 29 7  26 8 - 34 3 pg Final   • MCHC 06/01/2023 32 6  31 4 - 37 4 g/dL Final   • RDW 06/01/2023 12 8  11 6 - 15 1 % Final   • MPV 06/01/2023 10 1  8 9 - 12 7 fL Final   • Platelets 81/94/2245 243  149 - 390 Thousands/uL Final   • nRBC 06/01/2023 0  /100 WBCs Final   • Neutrophils Relative 06/01/2023 68  43 - 75 % Final   • Immat GRANS % 06/01/2023 0  0 - 2 % Final   • Lymphocytes Relative 06/01/2023 24  14 - 44 % Final   • Monocytes Relative 06/01/2023 8  4 - 12 % Final   • Eosinophils Relative 06/01/2023 0  0 - 6 % Final   • Basophils Relative 06/01/2023 0  0 - 1 % Final   • Neutrophils Absolute 06/01/2023 3 97  1 85 - 7 62 Thousands/µL Final   • Immature Grans Absolute 06/01/2023 0 02  0 00 - 0 20 Thousand/uL Final   • Lymphocytes Absolute 06/01/2023 1 45  0 60 - 4 47 Thousands/µL Final   • Monocytes Absolute 06/01/2023 0 46  0 17 - 1 22 Thousand/µL Final   • Eosinophils Absolute 06/01/2023 0 02  0 00 - 0 61 Thousand/µL Final   • Basophils Absolute 06/01/2023 0 02  0 00 - 0 10 Thousands/µL Final   • Iron Saturation 06/01/2023 21  15 - 50 % Final   • TIBC 06/01/2023 338  250 - 450 ug/dL Final   • Iron 06/01/2023 71  50 - 170 ug/dL Final    Patients treated with metal-binding drugs (ie  Deferoxamine) may have depressed iron values  • Ferritin 06/01/2023 83  11 - 307 ng/mL Final       Imaging:   No relevant imaging to review     Please note: This report has been generated by a voice recognition software system  Therefore there may be syntax, spelling, and/or grammatical errors  Please call if you have any questions

## 2023-07-27 ENCOUNTER — APPOINTMENT (OUTPATIENT)
Dept: LAB | Facility: CLINIC | Age: 28
End: 2023-07-27
Payer: COMMERCIAL

## 2023-07-27 DIAGNOSIS — D50.9 MICROCYTIC HYPOCHROMIC ANEMIA: ICD-10-CM

## 2023-07-27 DIAGNOSIS — D50.9 IRON DEFICIENCY ANEMIA, UNSPECIFIED IRON DEFICIENCY ANEMIA TYPE: ICD-10-CM

## 2023-07-27 LAB
BASOPHILS # BLD AUTO: 0.03 THOUSANDS/ÂΜL (ref 0–0.1)
BASOPHILS NFR BLD AUTO: 1 % (ref 0–1)
EOSINOPHIL # BLD AUTO: 0.05 THOUSAND/ÂΜL (ref 0–0.61)
EOSINOPHIL NFR BLD AUTO: 1 % (ref 0–6)
ERYTHROCYTE [DISTWIDTH] IN BLOOD BY AUTOMATED COUNT: 11.7 % (ref 11.6–15.1)
FERRITIN SERPL-MCNC: 61 NG/ML (ref 11–307)
HCT VFR BLD AUTO: 42.9 % (ref 34.8–46.1)
HGB BLD-MCNC: 14 G/DL (ref 11.5–15.4)
IMM GRANULOCYTES # BLD AUTO: 0.02 THOUSAND/UL (ref 0–0.2)
IMM GRANULOCYTES NFR BLD AUTO: 0 % (ref 0–2)
IRON SATN MFR SERPL: 35 % (ref 15–50)
IRON SERPL-MCNC: 98 UG/DL (ref 50–170)
LYMPHOCYTES # BLD AUTO: 1.14 THOUSANDS/ÂΜL (ref 0.6–4.47)
LYMPHOCYTES NFR BLD AUTO: 25 % (ref 14–44)
MCH RBC QN AUTO: 31 PG (ref 26.8–34.3)
MCHC RBC AUTO-ENTMCNC: 32.6 G/DL (ref 31.4–37.4)
MCV RBC AUTO: 95 FL (ref 82–98)
MONOCYTES # BLD AUTO: 0.34 THOUSAND/ÂΜL (ref 0.17–1.22)
MONOCYTES NFR BLD AUTO: 8 % (ref 4–12)
NEUTROPHILS # BLD AUTO: 2.94 THOUSANDS/ÂΜL (ref 1.85–7.62)
NEUTS SEG NFR BLD AUTO: 65 % (ref 43–75)
NRBC BLD AUTO-RTO: 0 /100 WBCS
PLATELET # BLD AUTO: 251 THOUSANDS/UL (ref 149–390)
PMV BLD AUTO: 10.6 FL (ref 8.9–12.7)
RBC # BLD AUTO: 4.51 MILLION/UL (ref 3.81–5.12)
TIBC SERPL-MCNC: 284 UG/DL (ref 250–450)
WBC # BLD AUTO: 4.52 THOUSAND/UL (ref 4.31–10.16)

## 2023-07-27 PROCEDURE — 83540 ASSAY OF IRON: CPT

## 2023-07-27 PROCEDURE — 36415 COLL VENOUS BLD VENIPUNCTURE: CPT

## 2023-07-27 PROCEDURE — 85025 COMPLETE CBC W/AUTO DIFF WBC: CPT

## 2023-07-27 PROCEDURE — 83550 IRON BINDING TEST: CPT

## 2023-07-27 PROCEDURE — 82728 ASSAY OF FERRITIN: CPT

## 2023-09-30 ENCOUNTER — APPOINTMENT (OUTPATIENT)
Dept: LAB | Age: 28
End: 2023-09-30
Payer: COMMERCIAL

## 2023-09-30 DIAGNOSIS — D50.9 IRON DEFICIENCY ANEMIA, UNSPECIFIED IRON DEFICIENCY ANEMIA TYPE: ICD-10-CM

## 2023-09-30 DIAGNOSIS — D50.9 MICROCYTIC HYPOCHROMIC ANEMIA: ICD-10-CM

## 2023-09-30 LAB
ALBUMIN SERPL BCP-MCNC: 4.3 G/DL (ref 3.5–5)
ALP SERPL-CCNC: 38 U/L (ref 34–104)
ALT SERPL W P-5'-P-CCNC: 13 U/L (ref 7–52)
ANION GAP SERPL CALCULATED.3IONS-SCNC: 9 MMOL/L
AST SERPL W P-5'-P-CCNC: 19 U/L (ref 13–39)
BASOPHILS # BLD AUTO: 0.01 THOUSANDS/ÂΜL (ref 0–0.1)
BASOPHILS NFR BLD AUTO: 0 % (ref 0–1)
BILIRUB SERPL-MCNC: 0.83 MG/DL (ref 0.2–1)
BUN SERPL-MCNC: 10 MG/DL (ref 5–25)
CALCIUM SERPL-MCNC: 9 MG/DL (ref 8.4–10.2)
CHLORIDE SERPL-SCNC: 101 MMOL/L (ref 96–108)
CO2 SERPL-SCNC: 26 MMOL/L (ref 21–32)
CREAT SERPL-MCNC: 0.78 MG/DL (ref 0.6–1.3)
EOSINOPHIL # BLD AUTO: 0.12 THOUSAND/ÂΜL (ref 0–0.61)
EOSINOPHIL NFR BLD AUTO: 2 % (ref 0–6)
ERYTHROCYTE [DISTWIDTH] IN BLOOD BY AUTOMATED COUNT: 11.9 % (ref 11.6–15.1)
FERRITIN SERPL-MCNC: 90 NG/ML (ref 11–307)
FOLATE SERPL-MCNC: >22.3 NG/ML
GFR SERPL CREATININE-BSD FRML MDRD: 103 ML/MIN/1.73SQ M
GLUCOSE P FAST SERPL-MCNC: 85 MG/DL (ref 65–99)
HCT VFR BLD AUTO: 42.4 % (ref 34.8–46.1)
HGB BLD-MCNC: 14.4 G/DL (ref 11.5–15.4)
IMM GRANULOCYTES # BLD AUTO: 0.02 THOUSAND/UL (ref 0–0.2)
IMM GRANULOCYTES NFR BLD AUTO: 0 % (ref 0–2)
IRON SATN MFR SERPL: 11 % (ref 15–50)
IRON SERPL-MCNC: 32 UG/DL (ref 50–212)
LYMPHOCYTES # BLD AUTO: 0.88 THOUSANDS/ÂΜL (ref 0.6–4.47)
LYMPHOCYTES NFR BLD AUTO: 15 % (ref 14–44)
MCH RBC QN AUTO: 31.1 PG (ref 26.8–34.3)
MCHC RBC AUTO-ENTMCNC: 34 G/DL (ref 31.4–37.4)
MCV RBC AUTO: 92 FL (ref 82–98)
MONOCYTES # BLD AUTO: 0.59 THOUSAND/ÂΜL (ref 0.17–1.22)
MONOCYTES NFR BLD AUTO: 10 % (ref 4–12)
NEUTROPHILS # BLD AUTO: 4.18 THOUSANDS/ÂΜL (ref 1.85–7.62)
NEUTS SEG NFR BLD AUTO: 73 % (ref 43–75)
NRBC BLD AUTO-RTO: 0 /100 WBCS
PLATELET # BLD AUTO: 208 THOUSANDS/UL (ref 149–390)
PMV BLD AUTO: 10.3 FL (ref 8.9–12.7)
POTASSIUM SERPL-SCNC: 4.2 MMOL/L (ref 3.5–5.3)
PROT SERPL-MCNC: 7.1 G/DL (ref 6.4–8.4)
RBC # BLD AUTO: 4.63 MILLION/UL (ref 3.81–5.12)
SODIUM SERPL-SCNC: 136 MMOL/L (ref 135–147)
TIBC SERPL-MCNC: 294 UG/DL (ref 250–450)
UIBC SERPL-MCNC: 262 UG/DL (ref 155–355)
VIT B12 SERPL-MCNC: 331 PG/ML (ref 180–914)
WBC # BLD AUTO: 5.8 THOUSAND/UL (ref 4.31–10.16)

## 2023-09-30 PROCEDURE — 36415 COLL VENOUS BLD VENIPUNCTURE: CPT

## 2023-09-30 PROCEDURE — 83918 ORGANIC ACIDS TOTAL QUANT: CPT

## 2023-09-30 PROCEDURE — 83540 ASSAY OF IRON: CPT

## 2023-09-30 PROCEDURE — 82728 ASSAY OF FERRITIN: CPT

## 2023-09-30 PROCEDURE — 80053 COMPREHEN METABOLIC PANEL: CPT

## 2023-09-30 PROCEDURE — 82746 ASSAY OF FOLIC ACID SERUM: CPT

## 2023-09-30 PROCEDURE — 82607 VITAMIN B-12: CPT

## 2023-09-30 PROCEDURE — 83550 IRON BINDING TEST: CPT

## 2023-09-30 PROCEDURE — 85025 COMPLETE CBC W/AUTO DIFF WBC: CPT

## 2023-10-03 LAB — METHYLMALONATE SERPL-SCNC: 169 NMOL/L (ref 0–378)

## 2023-10-06 ENCOUNTER — OFFICE VISIT (OUTPATIENT)
Dept: HEMATOLOGY ONCOLOGY | Facility: CLINIC | Age: 28
End: 2023-10-06
Payer: COMMERCIAL

## 2023-10-06 VITALS
DIASTOLIC BLOOD PRESSURE: 62 MMHG | SYSTOLIC BLOOD PRESSURE: 104 MMHG | OXYGEN SATURATION: 99 % | HEIGHT: 64 IN | RESPIRATION RATE: 18 BRPM | WEIGHT: 114.5 LBS | TEMPERATURE: 96.9 F | BODY MASS INDEX: 19.55 KG/M2 | HEART RATE: 78 BPM

## 2023-10-06 DIAGNOSIS — D50.9 MICROCYTIC HYPOCHROMIC ANEMIA: Primary | ICD-10-CM

## 2023-10-06 DIAGNOSIS — D50.8 IRON DEFICIENCY ANEMIA SECONDARY TO INADEQUATE DIETARY IRON INTAKE: ICD-10-CM

## 2023-10-06 DIAGNOSIS — D50.0 IRON DEFICIENCY ANEMIA DUE TO CHRONIC BLOOD LOSS: ICD-10-CM

## 2023-10-06 PROCEDURE — 99213 OFFICE O/P EST LOW 20 MIN: CPT

## 2023-10-06 NOTE — PROGRESS NOTES
[address] [facility] [id number]  Hematology Ambulatory Follow-Up  Raymond Collazo, 1995, [id number]  10/6/2023    Assessment/Plan:    1. Microcytic hypochromic anemia  2. Iron deficiency anemia secondary to inadequate dietary iron intake  3. Iron deficiency anemia due to chronic blood loss  Ms. Jack Aguillon is a 65-year-old female seen in follow-up for iron deficiency anemia. There is no clear source of her iron deficiency and on routine labs she was found to have a ferritin of 2 with a hemoglobin of 7.1. She was treated with 2 doses of IV Venofer which she tolerated well without any side effects. She has now been able to maintain her levels without any additional iron supplementation. She is only taking a multivitamin with iron. Her hemoglobin remained stable at 14.4 and ferritin adequate at 90. Most recent iron saturation is slightly low at 11% with a serum iron of 32. B12 also is decreasing to 330. Currently she has no symptoms or constitutional complaints or concerns. I suggested she add oral supplementation with "blood builder "containing iron, B12, folate, and vitamin C. She will start this once daily and increase to twice daily as tolerated. She will repeat labs in 6 months. If at that time she continues with no anemia or require subsequent dosing of IV iron she will be discharged to follow-up with her PCP regularly. - Iron Panel (Includes Ferritin, Iron Sat%, Iron, and TIBC); Future  - CBC and differential; Future  - Folate; Future  - Vitamin B12; Future  - Methylmalonic acid, serum; Future    The patient is scheduled for follow-up in approximately 6 months. Patient voiced agreement and understanding to the above. Patient knows to call the Hematology/Oncology office with any questions and concerns regarding the above.       Barrier(s) to care: None  The patient is able to self care.  ------------------------------------------------------------------------------------------------------    Chief Complaint   Patient presents with   • Follow-up       History of present illness:   Ranjan Chowdhury is a 23-year-old female with no significant past medical history seen in follow-up for iron deficiency anemia. She was originally seen in consultation in March 2023 after having routine labs with a ferritin of 2 and hemoglobin of 7.1. At the time she did not have significant symptoms associated with this and was overall feeling well. There is no clear source of her iron deficiency as she has no significant GI problems or symptoms as well as her. Very mild lasting only 4 to 5 days/month. She does not tolerate high doses of oral iron due to GI upset. She has no history of iron deficiency or anemia previously though there are no historical labs to review. She denies ever being told that she or a family member is diagnosed with thalassemia. She denies history of gastric bypass or colon resection.   She has never had a colonoscopy or EGD.     2/12/2023: Hemoglobin 7.1, MCV 71, platelets 388, WBC 8.84              Ferritin 2, TIBC 503, serum iron 15  IV Feraheme x2: 3/17 & 3/24  6/1/2023: Hemoglobin 13.3, MCV 91, platelets 181, WBC 9.25                     Ferritin 83, iron saturation 21%, TIBC 338, serum iron 71  9/30/2023: Hemoglobin 14.4, MCV 92, platelets 888, WBC 5.8   Iron saturation 11%, TIBC 294, serum iron 32, and ferritin 90   B12 330, folate >22.3    Interval history: She is doing well and feels good. She has no complaints or concerns today. She has been tolerating multivitamin with iron well. She is not taking any other oral supplementation at this time. Menstrual cycle remains regular. Review of Systems   Constitutional: Negative for activity change, appetite change, diaphoresis, fatigue, fever and unexpected weight change.    HENT: Negative for trouble swallowing and voice change. Eyes: Negative for photophobia and visual disturbance. Respiratory: Negative for cough, chest tightness and shortness of breath. Cardiovascular: Negative for chest pain, palpitations and leg swelling. Gastrointestinal: Negative for abdominal distention, abdominal pain, anal bleeding, blood in stool, constipation, diarrhea, nausea and vomiting. Endocrine: Negative for cold intolerance and heat intolerance. Genitourinary: Negative for dysuria, hematuria and urgency. Musculoskeletal: Negative for arthralgias, back pain, gait problem and joint swelling. Skin: Negative for pallor and rash. Neurological: Negative for dizziness, weakness, light-headedness, numbness and headaches. Hematological: Negative for adenopathy. Does not bruise/bleed easily. Psychiatric/Behavioral: Negative for confusion and sleep disturbance. Patient Active Problem List   Diagnosis   • Cervical intraepithelial neoplasia grade 1 and grade 2   • Iron deficiency anemia       Past Medical History:   Diagnosis Date   • Abnormal Pap smear of cervix    • Iron deficiency anemia 02/14/2023       Past Surgical History:   Procedure Laterality Date   • COLPOSCOPY  2020       Family History   Problem Relation Age of Onset   • No Known Problems Mother    • Hypertension Father    • Birth defects Sister         Born without arms   • No Known Problems Brother    • Breast cancer Maternal Grandmother    • Stroke Maternal Grandfather        Social History     Socioeconomic History   • Marital status: Unknown     Spouse name: Not on file   • Number of children: 0   • Years of education: Not on file   • Highest education level: Not on file   Occupational History   • Occupation: Speech Therapist at [employer]   Tobacco Use   • Smoking status: Never     Passive exposure: Never   • Smokeless tobacco: Never   Vaping Use   • Vaping Use: Never used   Substance and Sexual Activity   • Alcohol use:  Yes Alcohol/week: 2.0 standard drinks of alcohol     Types: 2 Cans of beer per week     Comment: Drinks alcohol socially   • Drug use: Never   • Sexual activity: Yes     Partners: Male     Birth control/protection: Condom Male   Other Topics Concern   • Not on file   Social History Narrative    Single - Engaged to be  9/23    Lives with fiance    No children    Speech Therapist at [employer]     Social Determinants of Health     Financial Resource Strain: Not on file   Food Insecurity: Not on file   Transportation Needs: Not on file   Physical Activity: Not on file   Stress: Not on file   Social Connections: Not on file   Intimate Partner Violence: Not on file   Housing Stability: Not on file         Current Outpatient Medications:   •  cholecalciferol (VITAMIN D3) 400 units tablet, Take 400 Units by mouth daily, Disp: , Rfl:   •  Multiple Vitamins-Minerals (multivitamin with minerals) tablet, Take 1 tablet by mouth daily, Disp: , Rfl:     Allergies   Allergen Reactions   • Amoxicillin Rash       Objective:  /62 (BP Location: Left arm)   Pulse 78   Temp (!) 96.9 °F (36.1 °C) (Tympanic)   Resp 18   Ht 5' 4" (1.626 m)   Wt 51.9 kg (114 lb 8 oz)   SpO2 99%   BMI 19.65 kg/m²    Physical Exam  Constitutional:       General: She is not in acute distress. Appearance: Normal appearance. She is normal weight. She is not ill-appearing. HENT:      Head: Normocephalic and atraumatic. Eyes:      Extraocular Movements: Extraocular movements intact. Conjunctiva/sclera: Conjunctivae normal.      Pupils: Pupils are equal, round, and reactive to light. Cardiovascular:      Rate and Rhythm: Normal rate and regular rhythm. Pulses: Normal pulses. Heart sounds: Normal heart sounds. No murmur heard. Pulmonary:      Effort: Pulmonary effort is normal. No respiratory distress. Breath sounds: Normal breath sounds. Abdominal:      General: Abdomen is flat.  Bowel sounds are normal. There is no distension. Palpations: Abdomen is soft. Tenderness: There is no abdominal tenderness. Musculoskeletal:         General: Normal range of motion. Cervical back: Normal range of motion. No tenderness. Right lower leg: No edema. Left lower leg: No edema. Lymphadenopathy:      Cervical: No cervical adenopathy. Skin:     General: Skin is warm and dry. Capillary Refill: Capillary refill takes less than 2 seconds. Coloration: Skin is not jaundiced or pale. Neurological:      General: No focal deficit present. Mental Status: She is alert and oriented to person, place, and time. Mental status is at baseline. Cranial Nerves: No cranial nerve deficit. Motor: No weakness. Gait: Gait normal.   Psychiatric:         Mood and Affect: Mood normal.         Behavior: Behavior normal.         Thought Content:  Thought content normal.         Judgment: Judgment normal.         Result Review  Labs:  Appointment on 09/30/2023   Component Date Value Ref Range Status   • WBC 09/30/2023 5.80  4.31 - 10.16 Thousand/uL Final   • RBC 09/30/2023 4.63  3.81 - 5.12 Million/uL Final   • Hemoglobin 09/30/2023 14.4  11.5 - 15.4 g/dL Final   • Hematocrit 09/30/2023 42.4  34.8 - 46.1 % Final   • MCV 09/30/2023 92  82 - 98 fL Final   • MCH 09/30/2023 31.1  26.8 - 34.3 pg Final   • MCHC 09/30/2023 34.0  31.4 - 37.4 g/dL Final   • RDW 09/30/2023 11.9  11.6 - 15.1 % Final   • MPV 09/30/2023 10.3  8.9 - 12.7 fL Final   • Platelets 88/41/9637 208  149 - 390 Thousands/uL Final   • nRBC 09/30/2023 0  /100 WBCs Final   • Neutrophils Relative 09/30/2023 73  43 - 75 % Final   • Immat GRANS % 09/30/2023 0  0 - 2 % Final   • Lymphocytes Relative 09/30/2023 15  14 - 44 % Final   • Monocytes Relative 09/30/2023 10  4 - 12 % Final   • Eosinophils Relative 09/30/2023 2  0 - 6 % Final   • Basophils Relative 09/30/2023 0  0 - 1 % Final   • Neutrophils Absolute 09/30/2023 4.18 1.85 - 7.62 Thousands/µL Final   • Immature Grans Absolute 09/30/2023 0.02  0.00 - 0.20 Thousand/uL Final   • Lymphocytes Absolute 09/30/2023 0.88  0.60 - 4.47 Thousands/µL Final   • Monocytes Absolute 09/30/2023 0.59  0.17 - 1.22 Thousand/µL Final   • Eosinophils Absolute 09/30/2023 0.12  0.00 - 0.61 Thousand/µL Final   • Basophils Absolute 09/30/2023 0.01  0.00 - 0.10 Thousands/µL Final   • Sodium 09/30/2023 136  135 - 147 mmol/L Final   • Potassium 09/30/2023 4.2  3.5 - 5.3 mmol/L Final   • Chloride 09/30/2023 101  96 - 108 mmol/L Final   • CO2 09/30/2023 26  21 - 32 mmol/L Final   • ANION GAP 09/30/2023 9  mmol/L Final   • BUN 09/30/2023 10  5 - 25 mg/dL Final   • Creatinine 09/30/2023 0.78  0.60 - 1.30 mg/dL Final    Standardized to IDMS reference method   • Glucose, Fasting 09/30/2023 85  65 - 99 mg/dL Final   • Calcium 09/30/2023 9.0  8.4 - 10.2 mg/dL Final   • AST 09/30/2023 19  13 - 39 U/L Final   • ALT 09/30/2023 13  7 - 52 U/L Final    Specimen collection should occur prior to Sulfasalazine administration due to the potential for falsely depressed results. • Alkaline Phosphatase 09/30/2023 38  34 - 104 U/L Final   • Total Protein 09/30/2023 7.1  6.4 - 8.4 g/dL Final   • Albumin 09/30/2023 4.3  3.5 - 5.0 g/dL Final   • Total Bilirubin 09/30/2023 0.83  0.20 - 1.00 mg/dL Final    Use of this assay is not recommended for patients undergoing treatment with eltrombopag due to the potential for falsely elevated results. N-acetyl-p-benzoquinone imine (metabolite of Acetaminophen) will generate erroneously low results in samples for patients that have taken an overdose of Acetaminophen. • eGFR 09/30/2023 103  ml/min/1.73sq m Final   • Folate 09/30/2023 >22.3  >5.9 ng/mL Final    The World Health Organization has determined deficient folate concentrations are considered to be <4.0 ng/mL.    • Vitamin B-12 09/30/2023 331  180 - 914 pg/mL Final   • Methylmalonic Acid, S 09/30/2023 169  0 - 378 nmol/L Final   • Iron Saturation 09/30/2023 11 (L)  15 - 50 % Final   • TIBC 09/30/2023 294  250 - 450 ug/dL Final   • Iron 09/30/2023 32 (L)  50 - 212 ug/dL Final    Patients treated with metal-binding drugs (ie. Deferoxamine) may have depressed iron values. • UIBC 09/30/2023 262  155 - 355 ug/dL Final   • Ferritin 09/30/2023 90  11 - 307 ng/mL Final       Imaging:   No relevant imaging to review    Please note: This report has been generated by a voice recognition software system. Therefore there may be syntax, spelling, and/or grammatical errors. Please call if you have any questions.

## 2023-10-06 NOTE — PATIENT INSTRUCTIONS
Blood Builder by Malcolm   With iron, W21, vit C, and folic acid     Start once a day increase to twice a day if symptoms start to return

## 2023-12-15 ENCOUNTER — OFFICE VISIT (OUTPATIENT)
Dept: FAMILY MEDICINE CLINIC | Facility: CLINIC | Age: 28
End: 2023-12-15
Payer: COMMERCIAL

## 2023-12-15 VITALS
SYSTOLIC BLOOD PRESSURE: 110 MMHG | HEIGHT: 64 IN | WEIGHT: 117.8 LBS | HEART RATE: 68 BPM | DIASTOLIC BLOOD PRESSURE: 66 MMHG | RESPIRATION RATE: 14 BRPM | BODY MASS INDEX: 20.11 KG/M2 | OXYGEN SATURATION: 99 % | TEMPERATURE: 97.9 F

## 2023-12-15 DIAGNOSIS — R21 RASH: Primary | ICD-10-CM

## 2023-12-15 PROCEDURE — 99213 OFFICE O/P EST LOW 20 MIN: CPT | Performed by: FAMILY MEDICINE

## 2023-12-15 RX ORDER — DESONIDE 0.5 MG/G
CREAM TOPICAL 2 TIMES DAILY
Qty: 30 G | Refills: 0 | Status: SHIPPED | OUTPATIENT
Start: 2023-12-15

## 2023-12-15 NOTE — PROGRESS NOTES
Name: Nathan Guajardo      : 1995      MRN: [id number]  Encounter Provider: Aniyah Colindres DO  Encounter Date: 12/15/2023   Encounter department: [facility]     1. Rash  Comments:  unclear cause  desowen to area BID  if no improvement in a week, send pic and will consider tineal tx  Orders:  -     desonide (DESOWEN) 0.05 % cream; Apply topically 2 (two) times a day           Subjective      HPI  Pt c/o erythematous very tiny papular rash in R nasolabial fold. Dry. Not itchy. Hydrating with aquaphor. Present for 3 weeks. Dry and flaky on it's own. Review of Systems  See hpi    Current Outpatient Medications on File Prior to Visit   Medication Sig    cholecalciferol (VITAMIN D3) 400 units tablet Take 400 Units by mouth daily    Multiple Vitamins-Minerals (multivitamin with minerals) tablet Take 1 tablet by mouth daily       Objective     /66   Pulse 68   Temp 97.9 °F (36.6 °C)   Resp 14   Ht 5' 4" (1.626 m)   Wt 53.4 kg (117 lb 12.8 oz)   SpO2 99%   BMI 20.22 kg/m²     Physical Exam  Constitutional:       Appearance: Normal appearance. Skin:     Comments: R nasolabial fold with erythematous area with very small papules   Neurological:      General: No focal deficit present. Mental Status: She is alert and oriented to person, place, and time.        Aniyah Colindres DO

## 2023-12-25 ENCOUNTER — PATIENT MESSAGE (OUTPATIENT)
Dept: FAMILY MEDICINE CLINIC | Facility: CLINIC | Age: 28
End: 2023-12-25

## 2023-12-25 DIAGNOSIS — R21 RASH: Primary | ICD-10-CM

## 2023-12-28 ENCOUNTER — TELEPHONE (OUTPATIENT)
Age: 28
End: 2023-12-28

## 2023-12-28 RX ORDER — KETOCONAZOLE 20 MG/G
CREAM TOPICAL 2 TIMES DAILY
Qty: 30 G | Refills: 1 | Status: SHIPPED | OUTPATIENT
Start: 2023-12-28

## 2023-12-28 NOTE — TELEPHONE ENCOUNTER
Pt called to say the rash is getting worse and it spreading. Its up near her eye now. She did stop using the cream on Monday 12/25/23 as it did not seem to be working. Pt declined another appt. Should she starting the cream again or try something else? Please advise.      Also please see her [employer] message.

## 2024-01-23 ENCOUNTER — PATIENT MESSAGE (OUTPATIENT)
Dept: FAMILY MEDICINE CLINIC | Facility: CLINIC | Age: 29
End: 2024-01-23

## 2024-01-23 DIAGNOSIS — R21 FACIAL RASH: Primary | ICD-10-CM

## 2024-01-23 NOTE — PATIENT COMMUNICATION
Hi Dr. Farnsworth,     I just wanted to send you an update on my rash. After about 2 weeks of using the prescribed Ketoconazole cream, it appeared to be getting more flat as opposed to bumpy and a bit less red.      I am coming up on 4 weeks this Friday and unfortunately the rash seems to look better some days than others but it still persists on the right side of my face around the lip area and now seems to be starting on the left side. I attached a picture for your review. Let me know if I should continue to use this cream past the 4 weeks or if there is something else we can try.      Thank you for your help!     Franci

## 2024-01-24 NOTE — PATIENT COMMUNICATION
Please see if we can expedite her into derm.  Have been treating facial rash with multiple creams with no improvement.